# Patient Record
Sex: MALE | Race: BLACK OR AFRICAN AMERICAN | Employment: UNEMPLOYED | URBAN - METROPOLITAN AREA
[De-identification: names, ages, dates, MRNs, and addresses within clinical notes are randomized per-mention and may not be internally consistent; named-entity substitution may affect disease eponyms.]

---

## 2020-09-26 ENCOUNTER — HOSPITAL ENCOUNTER (EMERGENCY)
Age: 50
Discharge: NURSING, REHAB OR LONG-TERM CARE | End: 2020-09-30
Attending: EMERGENCY MEDICINE
Payer: MEDICAID

## 2020-09-26 DIAGNOSIS — R41.3 MEMORY LOSS: Primary | ICD-10-CM

## 2020-09-26 LAB
AMMONIA PLAS-SCNC: 15 UMOL/L (ref 11–32)
AMPHET UR QL SCN: NEGATIVE
ANION GAP SERPL CALC-SCNC: 3 MMOL/L (ref 3–18)
APPEARANCE UR: CLEAR
BARBITURATES UR QL SCN: POSITIVE
BASOPHILS # BLD: 0 K/UL (ref 0–0.1)
BASOPHILS NFR BLD: 1 % (ref 0–2)
BENZODIAZ UR QL: NEGATIVE
BILIRUB UR QL: NEGATIVE
BUN SERPL-MCNC: 11 MG/DL (ref 7–18)
BUN/CREAT SERPL: 9 (ref 12–20)
CALCIUM SERPL-MCNC: 9 MG/DL (ref 8.5–10.1)
CANNABINOIDS UR QL SCN: NEGATIVE
CHLORIDE SERPL-SCNC: 113 MMOL/L (ref 100–111)
CO2 SERPL-SCNC: 28 MMOL/L (ref 21–32)
COCAINE UR QL SCN: NEGATIVE
COLOR UR: YELLOW
CREAT SERPL-MCNC: 1.24 MG/DL (ref 0.6–1.3)
DIFFERENTIAL METHOD BLD: ABNORMAL
EOSINOPHIL # BLD: 0.1 K/UL (ref 0–0.4)
EOSINOPHIL NFR BLD: 2 % (ref 0–5)
ERYTHROCYTE [DISTWIDTH] IN BLOOD BY AUTOMATED COUNT: 12.5 % (ref 11.6–14.5)
ETHANOL SERPL-MCNC: <3 MG/DL (ref 0–3)
GLUCOSE SERPL-MCNC: 92 MG/DL (ref 74–99)
GLUCOSE UR STRIP.AUTO-MCNC: NEGATIVE MG/DL
HCT VFR BLD AUTO: 42.1 % (ref 36–48)
HDSCOM,HDSCOM: ABNORMAL
HGB BLD-MCNC: 13.8 G/DL (ref 13–16)
HGB UR QL STRIP: NEGATIVE
KETONES UR QL STRIP.AUTO: NEGATIVE MG/DL
LEUKOCYTE ESTERASE UR QL STRIP.AUTO: NEGATIVE
LYMPHOCYTES # BLD: 0.7 K/UL (ref 0.9–3.6)
LYMPHOCYTES NFR BLD: 10 % (ref 21–52)
MCH RBC QN AUTO: 30.1 PG (ref 24–34)
MCHC RBC AUTO-ENTMCNC: 32.8 G/DL (ref 31–37)
MCV RBC AUTO: 91.7 FL (ref 74–97)
METHADONE UR QL: NEGATIVE
MONOCYTES # BLD: 0.8 K/UL (ref 0.05–1.2)
MONOCYTES NFR BLD: 11 % (ref 3–10)
NEUTS SEG # BLD: 5.1 K/UL (ref 1.8–8)
NEUTS SEG NFR BLD: 76 % (ref 40–73)
NITRITE UR QL STRIP.AUTO: NEGATIVE
OPIATES UR QL: NEGATIVE
PCP UR QL: NEGATIVE
PH UR STRIP: 7.5 [PH] (ref 5–8)
PLATELET # BLD AUTO: 255 K/UL (ref 135–420)
PMV BLD AUTO: 10.6 FL (ref 9.2–11.8)
POTASSIUM SERPL-SCNC: 4.4 MMOL/L (ref 3.5–5.5)
PROT UR STRIP-MCNC: NEGATIVE MG/DL
RBC # BLD AUTO: 4.59 M/UL (ref 4.7–5.5)
SODIUM SERPL-SCNC: 144 MMOL/L (ref 136–145)
SP GR UR REFRACTOMETRY: 1.01 (ref 1–1.03)
UROBILINOGEN UR QL STRIP.AUTO: 1 EU/DL (ref 0.2–1)
WBC # BLD AUTO: 6.7 K/UL (ref 4.6–13.2)

## 2020-09-26 PROCEDURE — 81003 URINALYSIS AUTO W/O SCOPE: CPT

## 2020-09-26 PROCEDURE — 74011250637 HC RX REV CODE- 250/637: Performed by: PHYSICIAN ASSISTANT

## 2020-09-26 PROCEDURE — 80048 BASIC METABOLIC PNL TOTAL CA: CPT

## 2020-09-26 PROCEDURE — 80307 DRUG TEST PRSMV CHEM ANLYZR: CPT

## 2020-09-26 PROCEDURE — 99285 EMERGENCY DEPT VISIT HI MDM: CPT

## 2020-09-26 PROCEDURE — 96374 THER/PROPH/DIAG INJ IV PUSH: CPT

## 2020-09-26 PROCEDURE — 96375 TX/PRO/DX INJ NEW DRUG ADDON: CPT

## 2020-09-26 PROCEDURE — 82140 ASSAY OF AMMONIA: CPT

## 2020-09-26 PROCEDURE — 96376 TX/PRO/DX INJ SAME DRUG ADON: CPT

## 2020-09-26 PROCEDURE — 85025 COMPLETE CBC W/AUTO DIFF WBC: CPT

## 2020-09-26 RX ORDER — LACOSAMIDE 50 MG/1
200 TABLET ORAL 2 TIMES DAILY
Status: DISCONTINUED | OUTPATIENT
Start: 2020-09-26 | End: 2020-09-30 | Stop reason: HOSPADM

## 2020-09-26 RX ORDER — TOPIRAMATE 100 MG/1
200 TABLET, FILM COATED ORAL 2 TIMES DAILY
Status: DISCONTINUED | OUTPATIENT
Start: 2020-09-27 | End: 2020-09-30 | Stop reason: HOSPADM

## 2020-09-26 RX ORDER — PHENOBARBITAL 64.8 MG/1
64.8 TABLET ORAL 2 TIMES DAILY
Status: DISCONTINUED | OUTPATIENT
Start: 2020-09-27 | End: 2020-09-30 | Stop reason: HOSPADM

## 2020-09-26 RX ORDER — TRAZODONE HYDROCHLORIDE 50 MG/1
100 TABLET ORAL ONCE
Status: COMPLETED | OUTPATIENT
Start: 2020-09-26 | End: 2020-09-26

## 2020-09-26 RX ORDER — LEVETIRACETAM 500 MG/1
2000 TABLET ORAL 2 TIMES DAILY
Status: DISCONTINUED | OUTPATIENT
Start: 2020-09-26 | End: 2020-09-30 | Stop reason: HOSPADM

## 2020-09-26 RX ADMIN — TRAZODONE HYDROCHLORIDE 100 MG: 50 TABLET ORAL at 22:25

## 2020-09-26 NOTE — ED PROVIDER NOTES
EMERGENCY DEPARTMENT HISTORY AND PHYSICAL EXAM    Date: 9/26/2020  Patient Name: [facility]    History of Presenting Illness     Chief Complaint   Patient presents with    Memory Loss         History Provided By: Patient    Chief Complaint: memory loss  Duration: today  Timing:  Acute  Location: N/A  Quality: N/A  Severity: Moderate  Modifying Factors: none  Associated Symptoms: denies any other associated signs or symptoms      Additional History (Context): [facility] is a 53 y.o. male with seizure who presents accompanied by police with c/o memory loss. He is at very poor historian, cannot recall most of events of this morning or last night. He believes he takes medications for it but cannot recall any names. Denies drug use, notes that he used to drink alcohol but has not had any alcohol recently. He admits to memory problems in the past and believes that his family member was concerned about his memory today. He cannot recall how he got to ER. He is oriented to self and place but not to time. Per prior records (in [facility]), pt has hx of seizure and epilepsy, memory disorder, limbic encephalitis, anxiety, throat cancer and secondary HTN    PCP: UNKNOWN        Past History     Past Medical History:  No past medical history on file. Past Surgical History:  No past surgical history on file. Family History:  No family history on file. Social History:  Social History     Tobacco Use    Smoking status: Not on file   Substance Use Topics    Alcohol use: Not on file    Drug use: Not on file       Allergies:  No Known Allergies      Review of Systems   Review of Systems   Constitutional: Negative. Negative for activity change, appetite change, chills and fever. HENT: Negative. Eyes: Negative for visual disturbance. Respiratory: Negative for cough, chest tightness and shortness of breath. Cardiovascular: Negative for chest pain, palpitations and leg swelling. Gastrointestinal: Negative for abdominal pain, constipation, diarrhea, nausea and vomiting. Genitourinary: Negative. Negative for difficulty urinating. Musculoskeletal: Negative. Negative for neck pain and neck stiffness. Skin: Negative. Neurological: Negative for dizziness, syncope, weakness and headaches. Hematological: Negative for adenopathy. Psychiatric/Behavioral: Negative for agitation, confusion, dysphoric mood, hallucinations, self-injury, sleep disturbance and suicidal ideas. The patient is not nervous/anxious. Memory problem per HPI   All other systems reviewed and are negative. All Other Systems Negative  Physical Exam     Vitals:    09/26/20 1530   BP: (!) 143/88   Pulse: 87   Resp: 16   Temp: 98.2 °F (36.8 °C)   SpO2: 98%   Weight: 81.6 kg (180 lb)   Height: 5' 11\" (1.803 m)     Physical Exam  Vitals signs and nursing note reviewed. Constitutional:       General: He is not in acute distress. Appearance: He is well-developed. HENT:      Head: Normocephalic and atraumatic. Mouth/Throat:      Mouth: Mucous membranes are moist.   Eyes:      Conjunctiva/sclera: Conjunctivae normal.      Pupils: Pupils are equal, round, and reactive to light. Neck:      Musculoskeletal: Normal range of motion and neck supple. Cardiovascular:      Rate and Rhythm: Normal rate and regular rhythm. Heart sounds: Normal heart sounds. Pulmonary:      Effort: Pulmonary effort is normal. No respiratory distress. Breath sounds: Normal breath sounds. Abdominal:      General: Bowel sounds are normal.      Palpations: Abdomen is soft. Tenderness: There is no abdominal tenderness. Musculoskeletal: Normal range of motion. Lymphadenopathy:      Cervical: No cervical adenopathy. Skin:     General: Skin is warm and dry. Comments: tattoos   Neurological:      Mental Status: He is alert and oriented to person, place, and time.       Deep Tendon Reflexes: Reflexes are normal and symmetric. Psychiatric:         Attention and Perception: Attention normal.         Mood and Affect: Mood normal.         Speech: Speech normal.         Behavior: Behavior normal.         Thought Content: Thought content normal.         Cognition and Memory: Cognition normal. He exhibits impaired recent memory (Cannot recall address, does not know who the president is, does not know the date, cannot recall who brought him here, he is unsure why he is here). He does not exhibit impaired remote memory. Judgment: Judgment normal.              Diagnostic Study Results     Labs -     Recent Results (from the past 12 hour(s))   URINALYSIS W/ RFLX MICROSCOPIC    Collection Time: 09/26/20  5:10 PM   Result Value Ref Range    Color YELLOW      Appearance CLEAR      Specific gravity 1.011 1.005 - 1.030      pH (UA) 7.5 5.0 - 8.0      Protein Negative NEG mg/dL    Glucose Negative NEG mg/dL    Ketone Negative NEG mg/dL    Bilirubin Negative NEG      Blood Negative NEG      Urobilinogen 1.0 0.2 - 1.0 EU/dL    Nitrites Negative NEG      Leukocyte Esterase Negative NEG     DRUG SCREEN, URINE    Collection Time: 09/26/20  5:10 PM   Result Value Ref Range    BENZODIAZEPINES Negative NEG      BARBITURATES Positive (A) NEG      THC (TH-CANNABINOL) Negative NEG      OPIATES Negative NEG      PCP(PHENCYCLIDINE) Negative NEG      COCAINE Negative NEG      AMPHETAMINES Negative NEG      METHADONE Negative NEG      HDSCOM (NOTE)        Radiologic Studies -   No orders to display     CT Results  (Last 48 hours)    None        CXR Results  (Last 48 hours)    None            Medical Decision Making   I am the first provider for this patient. I reviewed the vital signs, available nursing notes, past medical history, past surgical history, family history and social history. Vital Signs-Reviewed the patient's vital signs.       Pulse Oximetry Analysis - 98% on RA      Records Reviewed: Nursing Notes and Old Medical Records    Procedures:  Procedures    Provider Notes (Medical Decision Making):     IMPRESSION/PLAN:  4:59 PM   Well-appearing patient with history and exam consistent with memory loss. He denies any pain or any symptoms other than memory issue. Will check labs and proceed with  when medically cleared    7:00 PM  TURN OVER NOTE:   Consulted with Luz Cardenas PA-C concerning patient [facility], standard discussion of reason for visit, HPI, ROS, PE, and current results available. Report was given at this time. Provider above will assume care at his time  PENDING: blood work, may need consult with   LAURA Aranda      MED RECONCILIATION:  No current facility-administered medications for this encounter. No current outpatient medications on file. Disposition: Care turned over to the next provider      Follow-up Information    None         There are no discharge medications for this patient. Diagnosis     Clinical Impression:   1. Memory loss             Dictation disclaimer:  Please note that this dictation was completed with [employer], the computer voice recognition software. Quite often unanticipated grammatical, syntax, homophones, and other interpretive errors are inadvertently transcribed by the computer software. Please disregard these errors. Please excuse any errors that have escaped final proofreading.

## 2020-09-26 NOTE — ED NOTES
7:00 PM: Pt care assumed from Pattie Saunders, ED provider. Pt complaint(s), current treatment plan, progression and available diagnostic results have been discussed thoroughly. Rounding occurred: no  Intended Disposition: TBD   Pending diagnostic reports and/or labs (please list): awaiting labs; will need to speak with family members. Spoke with Humphrey, patient's cousin. She states that his wife reportedly just dropped his stuff off in the middle of the street at his aunt's house. However, his aunt reportedly just had a stroke and is unable to care for him. His aunt called the police. When the police arrived, they were planning to take him back to his home where he lives with his wife, but patient reportedly did not want to go home because he does not want to fight with his wife. So they brought him here to the emergency room. According to Humphrey, patient was just released from [facility] 9 months ago where he was reportedly being cared for in the medical unit due to progressive dementia and inability to care for himself. Wife was told he needed to go to a facility after being released. Wife declined to place him in a facility and took him home instead. Patient's cousin states that they have been unable to reach patient's wife by phone since he was dropped off. My attempts to reach patient's wife gone unanswered. I have left a voicemail message. 8:01 PM: Spoke with  Samanta Medina. As patient has Medicaid, she will work on getting him placement tomorrow. 8:37 PM: Received a phone call from patient's wife. She states that she does not know why I am calling her because she is not his wife anymore. I informed her of my above conversation with patient's cousin. Patient is unwilling to comment on this. She is unwilling to give any additional information about her interactions with the patient. She wanted to know how the patient got to the emergency department.   I explained he arrived with police. She states I need to call his family for information and not her.     2:11 AM : Pt care transferred to Dr. Matt Sheikh, ED provider. History of patient complaint(s), available diagnostic reports and current treatment plan has been discussed thoroughly. Bedside rounding on patient occured : no . Intended disposition of patient : TBD  Pending diagnostics reports and/or labs (please list): pending placement by case management.

## 2020-09-26 NOTE — ED TRIAGE NOTES
\"I don't know why I'm here. I guess I have memory problems. \" Patient unable to voice concerns however patient appears pleasantly confused.

## 2020-09-26 NOTE — ED NOTES
Pt's cousin called with medically relevaent information regarding pt. Please call  Blanca Braga [phone].

## 2020-09-27 LAB
COVID-19 RAPID TEST, COVR: NOT DETECTED
HEALTH STATUS, XMCV2T: NORMAL
SOURCE, COVRS: NORMAL
SPECIMEN TYPE, XMCV1T: NORMAL

## 2020-09-27 PROCEDURE — 74011250637 HC RX REV CODE- 250/637: Performed by: PHYSICIAN ASSISTANT

## 2020-09-27 PROCEDURE — 74011250636 HC RX REV CODE- 250/636: Performed by: EMERGENCY MEDICINE

## 2020-09-27 PROCEDURE — 96372 THER/PROPH/DIAG INJ SC/IM: CPT

## 2020-09-27 PROCEDURE — 96374 THER/PROPH/DIAG INJ IV PUSH: CPT

## 2020-09-27 PROCEDURE — 96375 TX/PRO/DX INJ NEW DRUG ADDON: CPT

## 2020-09-27 PROCEDURE — 87635 SARS-COV-2 COVID-19 AMP PRB: CPT

## 2020-09-27 RX ORDER — HALOPERIDOL 5 MG/ML
5 INJECTION INTRAMUSCULAR
Status: COMPLETED | OUTPATIENT
Start: 2020-09-27 | End: 2020-09-27

## 2020-09-27 RX ORDER — METOPROLOL TARTRATE 25 MG/1
25 TABLET, FILM COATED ORAL DAILY
Status: DISCONTINUED | OUTPATIENT
Start: 2020-09-27 | End: 2020-09-30 | Stop reason: HOSPADM

## 2020-09-27 RX ORDER — DIPHENHYDRAMINE HYDROCHLORIDE 50 MG/ML
50 INJECTION, SOLUTION INTRAMUSCULAR; INTRAVENOUS
Status: COMPLETED | OUTPATIENT
Start: 2020-09-27 | End: 2020-09-27

## 2020-09-27 RX ORDER — LOSARTAN POTASSIUM 50 MG/1
100 TABLET ORAL DAILY
Status: DISCONTINUED | OUTPATIENT
Start: 2020-09-27 | End: 2020-09-30 | Stop reason: HOSPADM

## 2020-09-27 RX ORDER — LORAZEPAM 2 MG/ML
2 INJECTION INTRAMUSCULAR
Status: COMPLETED | OUTPATIENT
Start: 2020-09-27 | End: 2020-09-27

## 2020-09-27 RX ADMIN — LORAZEPAM 2 MG: 2 INJECTION INTRAMUSCULAR; INTRAVENOUS at 19:40

## 2020-09-27 RX ADMIN — PHENOBARBITAL 64.8 MG: 64.8 TABLET ORAL at 00:53

## 2020-09-27 RX ADMIN — LEVETIRACETAM 2000 MG: 500 TABLET ORAL at 00:52

## 2020-09-27 RX ADMIN — HALOPERIDOL LACTATE 5 MG: 5 INJECTION, SOLUTION INTRAMUSCULAR at 19:40

## 2020-09-27 RX ADMIN — METOPROLOL TARTRATE 25 MG: 25 TABLET, FILM COATED ORAL at 09:09

## 2020-09-27 RX ADMIN — LACOSAMIDE 200 MG: 50 TABLET, FILM COATED ORAL at 12:23

## 2020-09-27 RX ADMIN — LACOSAMIDE 200 MG: 50 TABLET, FILM COATED ORAL at 00:52

## 2020-09-27 RX ADMIN — PHENOBARBITAL 64.8 MG: 64.8 TABLET ORAL at 09:09

## 2020-09-27 RX ADMIN — TOPIRAMATE 200 MG: 100 TABLET, FILM COATED ORAL at 09:08

## 2020-09-27 RX ADMIN — LEVETIRACETAM 2000 MG: 500 TABLET ORAL at 09:09

## 2020-09-27 RX ADMIN — LACOSAMIDE 200 MG: 50 TABLET, FILM COATED ORAL at 18:38

## 2020-09-27 RX ADMIN — PHENOBARBITAL 64.8 MG: 64.8 TABLET ORAL at 19:40

## 2020-09-27 RX ADMIN — TOPIRAMATE 200 MG: 100 TABLET, FILM COATED ORAL at 18:37

## 2020-09-27 RX ADMIN — DIPHENHYDRAMINE HYDROCHLORIDE 50 MG: 50 INJECTION, SOLUTION INTRAMUSCULAR; INTRAVENOUS at 19:40

## 2020-09-27 RX ADMIN — LORAZEPAM 2 MG: 2 INJECTION, SOLUTION INTRAMUSCULAR; INTRAVENOUS at 16:44

## 2020-09-27 RX ADMIN — TOPIRAMATE 200 MG: 100 TABLET, FILM COATED ORAL at 00:52

## 2020-09-27 RX ADMIN — HALOPERIDOL LACTATE 5 MG: 5 INJECTION, SOLUTION INTRAMUSCULAR at 16:44

## 2020-09-27 RX ADMIN — LEVETIRACETAM 2000 MG: 500 TABLET ORAL at 18:38

## 2020-09-27 RX ADMIN — LOSARTAN POTASSIUM 100 MG: 50 TABLET, FILM COATED ORAL at 09:08

## 2020-09-27 NOTE — PROGRESS NOTES
LAURA Cassidy called CM last night and stated pt's wife does not want anything to do with pt and pt will need placement. She was communicating with pt's cousin Leopold. Called pt's cousin Brown Carrion [phone]. Discussed options of nursing facility long term care placement and group home placement. She stated she will prefer nursing facility placement because of pt's health issues. She is requesting placement in [place], [place], [place], [place] or [place]. Per Humphrey, pt was released from [facility] about 9 months ago for drug possession. Informed her it might hinder our search for nursing home placement. Discussed options and her questions answered. Pt has UAI/LTSS done at [facility] on 1/7/20 tracking number [id number]. Pt's clinicals sent to multiple nursing facilities in the area in [place] and [facility]. Left a message for [employer] [facility] [address]: Notified Leonidas Martinez and she stated she will review for [address], [facility], [address] and [facility] and [address] .  Notified [facility] of [facility]. She will review and call CM back. Updated pt's nurse Duc Harper. 1430: Spoke with James Hodgson.  She stated she will review and let us know if she can accept pt. Pt will need covid testing for placement  4550: Per Sue Goff, they cannot accept pt today because they have to run his Medicaid through the business office and they will do that tomorrow morning and contact Case Management.       BEVERLY AndresN RN  Care Management  Pager: [phone]

## 2020-09-27 NOTE — BSMART NOTE
Client first observed at nursing station in handcuffs with police. Agitated, screaming, uncooperative, verbally threatening staff & police. He was not arrested, received medication. 54 yo M seen in ED room 12 at the request of . Alert, oriented to name & refusing to answer other orientation questions directly. Poor historian. Could not state why he was in the ED. States he lives in [place] with his wife and was \"just hanging out in [place]. \" States he is going home to . Per EMR & report, he cannot return to that home & case management is seeking placement in a managed care facility. Hx of seizure d/o, HTN, throat cancer, dementia, memory loss. Memory is inconsistent, answers vary to repeat same questions. At times appears to be confabulating to complete answers. Judgement impaired, series of behavioral outbursts observed in ED and reported PTA. Insight nil. Pacing in room 12, sometimes just walking into ramos to look around during conversation. States that this writer is [facility] nosy\" and asked several times why I was asking questions. Each time accepted response and continued interview, then would again become guarded and suspicious. Denies suicidal and homicidal ideations, Denies A / V / T/ O hallucinations. Denies illicit drug use. Denies alcohol use or problem. States he takes medicine but does not know any names. Could not or would not state what the incident with police was about earlier. Per staff: client requested coffee & was given to him. After drinking the coffee, he stated no one had given him coffee and he reacted violently when oriented to the situation. Hx of receiving early release a few years ago from [facility] in [place] due to medical issues. (cancer, seizures, memory loss) Discussed with . Discussed with . Client does not have criteria for acute psychiatric admission.

## 2020-09-27 NOTE — ED NOTES
Assumed care of pt at this time. Pt is awake and alert but confused about why he is here and how he got here. Pt states he knows he has seizures but is unsure of what is going on. Brody SANCHEZ aware of pt mentation.   Currently awaiting ammonia result

## 2020-09-27 NOTE — ED NOTES
Pt provided hot tea and breakfast tray, has occasional confusion and verbal outbursts but is easily redirectable to sit calmly in ED room. Other than occasional brief outbursts pt is very calm and pleasant sitting in room drinking tea.

## 2020-09-27 NOTE — ED NOTES
7:29 AM :Pt care assumed from Dr. Riana Menchaca, ED provider. Pt complaint(s), current treatment plan, progression and available diagnostic results have been discussed thoroughly. Intended Disposition: TBD   Pending diagnostic reports and/or labs (please list): wife put him out of home and told PA not to call her again. Pt with early dementia. Case management to help with dispo  Tab Certain DO    Patient in no apparent distress. Is resting. 10:16 AM reassessed patient he is in no distress, no new complaint    Patient getting agitated, ambulating in the ED yelling asking for coffee, however was easily redirected by security and went back to his room. RN notified me that case management is requesting a rapid COVID for placement. 4:38 PM  Patient left the room and is yelling loudly and very threatening and escalating very quickly, unable to redirect him. He has apparently forgotten that we have given him coffee, his continues to ask for coffee saying we did not give him any. However he has gotten too aggressive. We will give emergent Haldol and Ativan to prevent him from hurting someone or himself. Police in the ED and patient confronted the police aggressively. Was placed in handcuffs. Police states that they want patient to get treatment he needs. Not going to arrest him. Initially patient was going to get placement from case management, however I believe patient needs to be evaluated by crisis. We will consult with crisis. I discussed case with Markus Salinas from crisis. Patient with no apparent psychiatric history. Has a history of memory loss. She is recommending that we continue with case management placement.     Signed out to Dr. Alex Min

## 2020-09-28 PROCEDURE — 74011250637 HC RX REV CODE- 250/637: Performed by: PHYSICIAN ASSISTANT

## 2020-09-28 RX ADMIN — LOSARTAN POTASSIUM 100 MG: 50 TABLET, FILM COATED ORAL at 09:35

## 2020-09-28 RX ADMIN — LEVETIRACETAM 2000 MG: 500 TABLET ORAL at 09:36

## 2020-09-28 RX ADMIN — LACOSAMIDE 200 MG: 50 TABLET, FILM COATED ORAL at 18:33

## 2020-09-28 RX ADMIN — PHENOBARBITAL 64.8 MG: 64.8 TABLET ORAL at 09:36

## 2020-09-28 RX ADMIN — METOPROLOL TARTRATE 25 MG: 25 TABLET, FILM COATED ORAL at 09:36

## 2020-09-28 RX ADMIN — TOPIRAMATE 200 MG: 100 TABLET, FILM COATED ORAL at 09:35

## 2020-09-28 RX ADMIN — PHENOBARBITAL 64.8 MG: 64.8 TABLET ORAL at 18:32

## 2020-09-28 RX ADMIN — LACOSAMIDE 200 MG: 50 TABLET, FILM COATED ORAL at 09:36

## 2020-09-28 RX ADMIN — TOPIRAMATE 200 MG: 100 TABLET, FILM COATED ORAL at 18:33

## 2020-09-28 RX ADMIN — LEVETIRACETAM 2000 MG: 500 TABLET ORAL at 18:32

## 2020-09-28 NOTE — PROGRESS NOTES
CM called patient's cousin, Heriberto Santiago [phone], Brittanie Barber was still speaking with Ms. Aylin Wise with [facility]. CM let Brittanie Barber know that CM will followup with her in the morning to see if patient has adult children, adult parents or siblings for next of kin.       Dany Sprague RN  Case Management [phone]

## 2020-09-28 NOTE — PROGRESS NOTES
CM sent booking request to the whole state [facility] in [place], and uploaded clinicals. CM called [facility], and left a message with the  Sravan Bee [phone]. Sravan Bee called CM back and said patient and wife divorce was final in [employer] in June 2020. Cyrus Castillo said patient's case was transferred to [facility] about 2pm today. CAIT asked Dr. Rodrigo Ramirez about a possible Crisis consult, Dr. Rodrigo Ramirez said no, Heron Torres made aware.      Caryl Davies, RN  Case Management [phone]

## 2020-09-28 NOTE — ED NOTES
Pt ambulated down ramos with unsteady gait and then urinated on floor. Techs and RNs assisted pt back to bed. Pt changed and cleaned. Vital signs stable.

## 2020-09-28 NOTE — PROGRESS NOTES
CAIT called and left a voicemail for Cynthia at [facility] to see if she has a [address] [place] [facility] bed, phone number given for return call.      Edmund Vo RN  Case Management [phone]

## 2020-09-28 NOTE — PROGRESS NOTES
Patient's cousin Ela Liu [phone] called to make sure we have it in patient's chart that patient and his wife Mariana Pagan are still legally  through [place] system. CM updated patient's cousin that CM did call [facility]. Patient's cousin said she will be calling [facility] herself also. CAIT explained that we are exploring other alternatives besides SNF and Group Homes.        Hany Zheng RN  Case Management [phone]

## 2020-09-28 NOTE — ED NOTES
Pt resting comfortably in supine position. Respirations even and unlabored. No current signs of distress.

## 2020-09-28 NOTE — ED NOTES
Shift report given to oncoming RN, Anna Bojorquez. Pt medicated after another verbal outburst and is presently calm in room.

## 2020-09-28 NOTE — ED NOTES
Pt walked into ramos and began yelling at top of lungs about wanting coffee. Pt was given a small amount of coffee and a sandwich and redirected. Pt refusing to put monitor back on. No current signs or symptoms of distress.

## 2020-09-28 NOTE — PROGRESS NOTES
CAIT spoke with Gm Ramos [phone], patient's cousin, again to explain CM would explore group homes. Morley Severe said patient and his wife are still legally , [place] [phone]. CM spoke with Waylon LONG and made her aware. CM called patient's wife [place] [phone], and received voicemail. CM left a message stating she needed to come  her , he was not being admitted, and phone number left for a return call. CM left her know if CAIT did not hear from her, CM would be contacting Adult Protective Services for Abandonment.       Mango Villarreal, RN  Case Management [phone]

## 2020-09-28 NOTE — ED NOTES
7 AM patient turned over to me Dr. Akin Goff he is a 61-year-old gentleman who has dementia with no where to go. Pt resting comfortably without complaint.       Silvio Ruiz MD    5 PM patient turned over to Dr. Danyelle Sparrow pending observation and hopefully some sort of placement tomorrow

## 2020-09-28 NOTE — ED NOTES
Pt found standing in room, confused and agitated. Pt had peed on floor. Pt redirected and floor cleaned.

## 2020-09-28 NOTE — PROGRESS NOTES
CM spoke to Ms Raz Reese with her [address] [phone], she cannot take patient due to safety concerns of her other patients. CM informed Kaci Murray and Odalys Shen. CM called and spoke to Isha Parson with her [address] [phone]. Gisselle Langston said she was Retired now, and to please take her off the [address] List.     CM informed Kaci Murray and Odalys Shen.         William Anderson, RN  Case Management [phone]

## 2020-09-28 NOTE — PROGRESS NOTES
CM called [facility] [place] [address], and spoke to [address].  gave [facility] requested information including patient's wife's name and phone number. [facility] said they would treat case as Emergent due to patient in ER, and does not need to be medically here per [facility]. Eyal Keenan and Viviana Dailey updated and made aware.        Gypsy Jorge, RN  Case Management [phone]

## 2020-09-28 NOTE — PROGRESS NOTES
Rapid Covid Test was negative, uploaded to [facility]. CM Messaged [place] [facility] [place] facilities, [facility] [place], [facility] [place], [address], and [employer] and [place]. Messaged [place] with [address], asking for history and income, CM let her know to look at ED provider not 09/26/2020 1649 for history. CM went and spoke to patient, he did not know about a check or money, alert to self and place. CM called and spoke to patient's cousin Lily Hoffman [phone]. Gladys Bahena said patient's ex-wife [facility] told her that the patient receives SSI $416 a month based on ex-wifes income. Patient ex-wife sends a copy of her paycheck to  each month, and her income at times will decrease his income. Cristian from [facility] made aware.      Blas Boyer, RN  Case Management [phone]

## 2020-09-28 NOTE — PROGRESS NOTES
CM called [facility] and spoke to Ms Chaparro Romo, she said she just got off the phone with the [address] [place] [address], and received report on this patient. CM offered patient's cousin name and number. Ms. Chaparro Romo said she would reach out to the patient's cousin now.          Nikihl Etienne RN  Case Management [phone]

## 2020-09-28 NOTE — ED NOTES
Report received from [facility]. Pt sitting in doorway quietly. Agreeable to Vital sign check, cooperative. No distress noted, asked what the date was today and writing in his notebook.

## 2020-09-28 NOTE — ED NOTES
Pt walking in hallway, has drank several cups of coffee and tea today. Pt cooperative and directs easily at present.

## 2020-09-29 PROCEDURE — 74011250637 HC RX REV CODE- 250/637: Performed by: PHYSICIAN ASSISTANT

## 2020-09-29 RX ADMIN — PHENOBARBITAL 64.8 MG: 64.8 TABLET ORAL at 18:19

## 2020-09-29 RX ADMIN — LEVETIRACETAM 2000 MG: 500 TABLET ORAL at 08:39

## 2020-09-29 RX ADMIN — TOPIRAMATE 200 MG: 100 TABLET, FILM COATED ORAL at 08:37

## 2020-09-29 RX ADMIN — LEVETIRACETAM 2000 MG: 500 TABLET ORAL at 18:19

## 2020-09-29 RX ADMIN — TOPIRAMATE 200 MG: 100 TABLET, FILM COATED ORAL at 18:19

## 2020-09-29 RX ADMIN — PHENOBARBITAL 64.8 MG: 64.8 TABLET ORAL at 08:39

## 2020-09-29 RX ADMIN — LOSARTAN POTASSIUM 100 MG: 50 TABLET, FILM COATED ORAL at 08:37

## 2020-09-29 RX ADMIN — LACOSAMIDE 200 MG: 50 TABLET, FILM COATED ORAL at 08:37

## 2020-09-29 RX ADMIN — LACOSAMIDE 200 MG: 50 TABLET, FILM COATED ORAL at 18:19

## 2020-09-29 RX ADMIN — METOPROLOL TARTRATE 25 MG: 25 TABLET, FILM COATED ORAL at 08:39

## 2020-09-29 NOTE — PROGRESS NOTES
CM called and spoke to [facility] and explained that CM was waiting on [facility] to see what they were going to to do. CM called [facility] back to let her know a SNF [facility] in [place], [place] accepted the patient, and would most likely be leaving tomorrow.        Gisela Irwin, RN  Case Management [phone]

## 2020-09-29 NOTE — PROGRESS NOTES
UAI/LTSS [place] on this date.        Naomi Britt, RN  Case Management [phone]

## 2020-09-29 NOTE — PROGRESS NOTES
Spoke with Mariana Cage at [employer] who is reviewing pt's chart for possible admission in [place]. Robb to call this CM back ASAP.        [facility]  [address] [facility] [place], [address] [place] [address]  Phone [phone]      ROGER Linder, [place]- [phone]

## 2020-09-29 NOTE — PROGRESS NOTES
CM called [employer] [phone],  and spoke to Ms Dario Yeboah, who was covering for Ms Flor Sung. CM left message to please have Ms Flor Sung call CM back with any updates on patient's case.       Gypsy Jorge RN  Case Management [phone]

## 2020-09-29 NOTE — ED NOTES
Assumed care of the patient at 7 AM from Dr. Merlinda Alto. The patient is without complaint. We are awaiting a plan from case management. Yoan Sunshine, DO 3:16 PM    Case management has a potential facility for the patient tomorrow but asked that he is no longer given antipsychotics if not needed. Will sign out to the overnight team to follow placement tomorrow. Yoan Sunshine, DO 4:35 PM

## 2020-09-29 NOTE — PROGRESS NOTES
Voicemail left for Ms. Paola Miguelina, supervisor at [address] [place] [address] requesting a return call.     Heron Torres, MSN, RN, ACM-RN  Care Management  [phone]

## 2020-09-29 NOTE — PROGRESS NOTES
CM called and spoke to Ms Jarrett Hayden at [facility] regarding APS with patient. Ms Jarrett Hayden spoke to the patient's cousin Vaughn Falcon and found out, \"patient's ex-wife dropped patient off 09/26/2020 at 12pm at patient's [address] [place] [address] in [place] with his belongings, without notice to the patient's sister home who just had a stroke. Higinio Mckinnon and her brother just happened to be at the house. Higinio Mckinnon and her brother told the patient's ex-wife she can't do this. Patient's ex-wife said she was done, and she couldn't do this anymore. The police were called due to patient's wife just dropping and leaving him at his Aunt's house. The police brought him to [facility]. \"  Ms Jarrett Hayden said the patient's divorce was in June 2020 from [facility], but she continued to have him live with her up until 09/26/2020. Ms Jarrett Hayden said the case sounds like the patient's ex-wife [facility] did an \"illegal eviction\", she needs to discuss this with her supervisors, and will keep in touch with CM.        Bindu Biggs, RN  Case Management [phone]

## 2020-09-30 VITALS
WEIGHT: 180 LBS | BODY MASS INDEX: 25.2 KG/M2 | SYSTOLIC BLOOD PRESSURE: 127 MMHG | DIASTOLIC BLOOD PRESSURE: 78 MMHG | OXYGEN SATURATION: 98 % | HEART RATE: 85 BPM | RESPIRATION RATE: 18 BRPM | TEMPERATURE: 97.5 F | HEIGHT: 71 IN

## 2020-09-30 PROCEDURE — 74011250637 HC RX REV CODE- 250/637: Performed by: PHYSICIAN ASSISTANT

## 2020-09-30 RX ORDER — TOPIRAMATE 100 MG/1
200 TABLET, FILM COATED ORAL
Status: DISCONTINUED | OUTPATIENT
Start: 2020-09-30 | End: 2020-09-30

## 2020-09-30 RX ORDER — LACOSAMIDE 50 MG/1
200 TABLET ORAL
Status: DISCONTINUED | OUTPATIENT
Start: 2020-09-30 | End: 2020-09-30

## 2020-09-30 RX ORDER — PHENOBARBITAL 30 MG/1
60 TABLET ORAL
Status: DISCONTINUED | OUTPATIENT
Start: 2020-09-30 | End: 2020-09-30

## 2020-09-30 RX ORDER — METOPROLOL TARTRATE 25 MG/1
25 TABLET, FILM COATED ORAL
Status: DISCONTINUED | OUTPATIENT
Start: 2020-09-30 | End: 2020-09-30

## 2020-09-30 RX ORDER — LOSARTAN POTASSIUM 50 MG/1
100 TABLET ORAL DAILY
Status: DISCONTINUED | OUTPATIENT
Start: 2020-10-01 | End: 2020-09-30

## 2020-09-30 RX ADMIN — LEVETIRACETAM 2000 MG: 500 TABLET ORAL at 10:36

## 2020-09-30 RX ADMIN — LOSARTAN POTASSIUM 100 MG: 50 TABLET, FILM COATED ORAL at 10:36

## 2020-09-30 RX ADMIN — TOPIRAMATE 200 MG: 100 TABLET, FILM COATED ORAL at 10:36

## 2020-09-30 RX ADMIN — PHENOBARBITAL 64.8 MG: 64.8 TABLET ORAL at 10:36

## 2020-09-30 RX ADMIN — LACOSAMIDE 200 MG: 50 TABLET, FILM COATED ORAL at 10:35

## 2020-09-30 RX ADMIN — METOPROLOL TARTRATE 25 MG: 25 TABLET, FILM COATED ORAL at 10:36

## 2020-09-30 NOTE — ED NOTES
Patient sitting in chair in the room eating lunch. 0 s/s of distress noted. 0 complaints at this time.

## 2020-09-30 NOTE — ED NOTES
Assumed care of patient, remains alert, ambulatory, and verbal. No acute distress noted. Needs met. Awaiting transport via EMTALA to [facility].

## 2020-09-30 NOTE — ED NOTES
Patient's medication per report is on Vimpat 200 mg p.o. 2 times daily  Keppra 2000 mg 2 times daily p.o. Cozaar 100 mg daily  Lopressor 25 mg daily   Phenobarbital 64.8 mg 2 times daily  Topamax 200 mg 2 times daily    Cardiac diet      Seen and evaluated no complaints General normal exam heart regular lungs are clear abdomen soft standing in the doorway with no complaints displaceable orders discussed the pharmacy it is right in place pharmacy is going to cancel those orders.   No new meds given

## 2020-09-30 NOTE — PROGRESS NOTES
[facility] [phone], set up for [employer] for anytime between 2:15-4:45pm today taking patient to [facility].  Confirmation#[id number]

## 2020-09-30 NOTE — PROGRESS NOTES
CM updated [employer] with patient's SSN, emergency contact name and phone number in [place] per request.       Cesario Roy, RN  Case Management [phone]

## 2020-09-30 NOTE — PROGRESS NOTES
CM had missed calls from patient's niece this am Jarome Resides [phone], and called her back and spoke to her. Armando Fiore was not happy with [facility] in [place], [place], low star rating. CM informed her this is the only facility in the state of [place] that has pending acceptance at this time for this patient.       Cesario Roy, RN  Case Management [phone]

## 2020-09-30 NOTE — PROGRESS NOTES
CM asked [facility] unit secretary to set up [employer] transportation to: [facility]  [address], [address] [facility]  [phone]    CM went and spoke to the patient and let him know of the transition plan today, and transport coming within 1-3 hours to pick him up. Patient is agreeable to the transition plan today. CM spoke to [facility] and made her aware of patient discharging to SNF/LTC today. [facility] spoke to Dr. Katalina Jacob, and asked for ED discharge summary to include diet and meds. CM called and spoke to [facility], patient's niece, and let her know patient discharging to above facility today between 1-3 hours, she is agreeable to the transition plan today.        Kim Coy RN  Case Management [phone]

## 2020-09-30 NOTE — PROGRESS NOTES
Patient discharging today to SNF/Long Term Care:    [facility]  [address]   [place], [address]  Phone [phone]    Patient going to Room  896H       gave Karri Gray RN phone number to call report to facility [phone].        Greyson Rios, RN  Case Management [phone]

## 2020-09-30 NOTE — PROGRESS NOTES
Transition of Care Plan to SNF/Rehab    SNF/Rehab Transition:  Patient has been accepted to [facility] and meets criteria for admission. Patient will transported by [address] transportation and expected to leave anytime between 2:15pm-4:45pm today. Communication to Patient/Family:  Met with patient and spoke to Amol Richardson patient's niece (identified care giver) and they are agreeable to the transition plan. Communication to SNF/Rehab:  Bedside RN, Edward Parra, has been notified to update the transition plan to the facility and call report (phone number [phone]). Discharge information has been updated on the AVS.     Discharge instructions to be uploaded to [facility] [facility] completed. Nursing Please include all hard scripts for controlled substances, med rec and dc summary, and AVS in packet. Reviewed and confirmed with facility, [facility], can manage the patient care needs for the following:     Ja Rosario with (X) only those applicable:    Medication:  [x]  Medications will be available at the facility  []  IV Antibiotics   []  Controlled Substance - hard copy to be sent with patient   []  Weekly Labs   Documents:  [] Hard RX  [] MAR  [] Kardex  [] AVS  []Transfer Summary  [x]Discharge Summary   Equipment:  []  CPAP/BiPAP  []  Wound Vacuum  []  Lindsay or Urinary Device  []  PICC/Central Line  []  Nebulizer  []  Ventilator   Treatment:  []Isolation (for MRSA, VRE, etc.)  []Surgical Drain Management  []Tracheostomy Care  []Dressing Changes  []Dialysis with transportation and chair time. []PEG Care  []Oxygen  []Daily Weights for Heart Failure   Dietary:  []Any diet limitations  []Tube Feedings   []Total Parenteral Management (TPN)   Eligible for Medicaid Long Term Services and Supports  Yes:  [] Eligible for medical assistance or will become eligible within 180 days and UAI completed. [] Provider/Patient and/or support system has requested screening.   [] UAI copy provided to patient or responsible party. [x] UAI unavailable at discharge will send once processed to SNF provider. [] UAI unavailable at discharged mailed to patient  No:   [] Private pay and is not financially eligible for Medicaid within the next 180 days. [] Reside out-of-state.   [] A residents of a state owned/operated facility that is licensed  by [facility] or [facility]  [] Enrollment in [facility] hospice services  [] [address] [place] [address] [address]  [] Patient /Family declines to have screening completed or provide financial information for screening     Financial Resources:  Medicaid    [] Initiated and application pending   [x] Full coverage     Advanced Care Plan:  []Surrogate Decision Maker of Care  []POA  [x]Communicated Code Status Full  (DDNR\", \"Full\")    Other

## 2020-09-30 NOTE — PROGRESS NOTES
UAI/LTSS and SNF Note uploaded to [facility]. Discharge medication list and diet order uploaded to [facility].        Kena Gomez RN  Case Management [phone]

## 2020-09-30 NOTE — PROGRESS NOTES
conducted an initial consultation and Spiritual Assessment for [facility], who is a 53 y.o.,male. Patient's Primary Language is: [place]. According to the patient's EMR Congregation Affiliation is: No preference. The reason the Patient came to the hospital is: There are no active problems to display for this patient. The  provided the following Interventions:  Initiated a relationship of care and support. Listened empathically. Provided chaplaincy education. Provided information about Spiritual Care Services. Offered prayer and assurance of continued prayers on patient's behalf. Chart reviewed. The following outcomes where achieved:  Patient expressed gratitude for 's visit. Assessment:  There are no spiritual or Jew issues which require intervention at this time. Plan:  Chaplains will continue to follow and will provide pastoral care on an as needed/requested basis.     Jace [address]   [phone]

## 2020-09-30 NOTE — ED NOTES
Patient turned over to me Dr. Melisas Bermudez 52-year-old gentleman pending placement for dementia with nowhere to go.

## 2020-09-30 NOTE — PROGRESS NOTES
Discharge order noted for today. Patient has been accepted to [facility] nursing [facility]. Confirmed with Luiza Alston that bed is available today. Met with patient and spoke to Heriberto Santiago, patient's cousin  and are agreeable to the transition plan today. Transport to facility has been arranged through [facility] anytime between 2:15pm-4:45pm  time. Patient's discharge summary will be forwarded to skilled nursing facility via [facility]. Bedside RN, Collin Miguel , has been updated to the transition plan. Discharge information has been updated on the AVS.  Please call report to [phone].       Dany Sprague RN  Case Management [phone]

## 2020-09-30 NOTE — PROGRESS NOTES
Received call from [address] [place] [address] at [facility] and are prepared to accept pt today. Requested a 4pm pickup for transport. Updated CM, Doc Jimenez.     [employer]  [address] [facility], [address] [facility]  Phone 21 [phone], [place], [place]- [phone]

## 2020-09-30 NOTE — ED NOTES
EMTALA printed. Patient is now leaving with medical transport staff. All belongings given to transport team by patient.  printed down chart and patient is now leaving premises. No acute distress noted. Needs met.

## 2020-10-02 ENCOUNTER — APPOINTMENT (OUTPATIENT)
Dept: CT IMAGING | Age: 50
DRG: 053 | End: 2020-10-02
Attending: EMERGENCY MEDICINE
Payer: MEDICAID

## 2020-10-02 ENCOUNTER — HOSPITAL ENCOUNTER (INPATIENT)
Age: 50
LOS: 6 days | Discharge: HOME HEALTH | DRG: 053 | End: 2020-10-08
Attending: EMERGENCY MEDICINE | Admitting: HOSPITALIST
Payer: MEDICAID

## 2020-10-02 ENCOUNTER — APPOINTMENT (OUTPATIENT)
Dept: GENERAL RADIOLOGY | Age: 50
DRG: 053 | End: 2020-10-02
Attending: EMERGENCY MEDICINE
Payer: MEDICAID

## 2020-10-02 DIAGNOSIS — G40.901 STATUS EPILEPTICUS (HCC): Primary | ICD-10-CM

## 2020-10-02 DIAGNOSIS — G40.209 PARTIAL SYMPTOMATIC EPILEPSY WITH COMPLEX PARTIAL SEIZURES, NOT INTRACTABLE, WITHOUT STATUS EPILEPTICUS (HCC): ICD-10-CM

## 2020-10-02 PROBLEM — R41.3 MEMORY DISORDER: Status: ACTIVE | Noted: 2019-10-04

## 2020-10-02 PROBLEM — G04.90 LIMBIC ENCEPHALITIS: Status: ACTIVE | Noted: 2019-10-04

## 2020-10-02 PROBLEM — R41.82 ALTERED MENTAL STATUS: Status: ACTIVE | Noted: 2020-10-02

## 2020-10-02 PROBLEM — F41.9 ANXIETY: Status: ACTIVE | Noted: 2019-10-04

## 2020-10-02 PROBLEM — C14.0 THROAT CANCER (HCC): Status: ACTIVE | Noted: 2019-07-22

## 2020-10-02 PROBLEM — G40.909 EPILEPSIA (HCC): Status: ACTIVE | Noted: 2019-10-14

## 2020-10-02 PROBLEM — G47.00 INSOMNIA: Status: ACTIVE | Noted: 2020-07-01

## 2020-10-02 LAB
ALBUMIN SERPL-MCNC: 4.5 G/DL (ref 3.5–5)
ALBUMIN/GLOB SERPL: 1.3 {RATIO} (ref 1.1–2.2)
ALP SERPL-CCNC: 150 U/L (ref 45–117)
ALT SERPL-CCNC: 26 U/L (ref 12–78)
AMPHET UR QL SCN: NEGATIVE
ANION GAP SERPL CALC-SCNC: 7 MMOL/L (ref 5–15)
APPEARANCE UR: CLEAR
APTT PPP: 23.5 SEC (ref 23–35.7)
AST SERPL W P-5'-P-CCNC: 28 U/L (ref 15–37)
BACTERIA URNS QL MICRO: NEGATIVE /HPF
BARBITURATES UR QL SCN: POSITIVE
BASOPHILS # BLD: 0 K/UL (ref 0–0.1)
BASOPHILS NFR BLD: 0 % (ref 0–1)
BENZODIAZ UR QL: NEGATIVE
BILIRUB SERPL-MCNC: 0.2 MG/DL (ref 0.2–1)
BILIRUB UR QL: NEGATIVE
BNP SERPL-MCNC: 16 PG/ML
BUN SERPL-MCNC: 13 MG/DL (ref 6–20)
BUN/CREAT SERPL: 10 (ref 12–20)
CA-I BLD-MCNC: 8.3 MG/DL (ref 8.5–10.1)
CANNABINOIDS UR QL SCN: NEGATIVE
CHLORIDE SERPL-SCNC: 104 MMOL/L (ref 97–108)
CO2 SERPL-SCNC: 23 MMOL/L (ref 21–32)
COCAINE UR QL SCN: NEGATIVE
COLOR UR: NORMAL
CREAT SERPL-MCNC: 1.34 MG/DL (ref 0.7–1.3)
DIFFERENTIAL METHOD BLD: ABNORMAL
DRUG SCRN COMMENT,DRGCM: ABNORMAL
EOSINOPHIL # BLD: 0.1 K/UL (ref 0–0.4)
EOSINOPHIL NFR BLD: 1 % (ref 0–7)
ERYTHROCYTE [DISTWIDTH] IN BLOOD BY AUTOMATED COUNT: 12.4 % (ref 11.5–14.5)
GLOBULIN SER CALC-MCNC: 3.6 G/DL (ref 2–4)
GLUCOSE SERPL-MCNC: 154 MG/DL (ref 65–100)
GLUCOSE UR STRIP.AUTO-MCNC: NEGATIVE MG/DL
HCT VFR BLD AUTO: 42.1 % (ref 36.6–50.3)
HGB BLD-MCNC: 13.6 G/DL (ref 12.1–17)
HGB UR QL STRIP: NEGATIVE
IMM GRANULOCYTES # BLD AUTO: 0.1 K/UL (ref 0–0.04)
IMM GRANULOCYTES NFR BLD AUTO: 1 % (ref 0–0.5)
KETONES UR QL STRIP.AUTO: NEGATIVE MG/DL
LEUKOCYTE ESTERASE UR QL STRIP.AUTO: NEGATIVE
LYMPHOCYTES # BLD: 1.4 K/UL (ref 0.8–3.5)
LYMPHOCYTES NFR BLD: 11 % (ref 12–49)
MCH RBC QN AUTO: 29.8 PG (ref 26–34)
MCHC RBC AUTO-ENTMCNC: 32.3 G/DL (ref 30–36.5)
MCV RBC AUTO: 92.3 FL (ref 80–99)
METHADONE UR QL: NEGATIVE
MONOCYTES # BLD: 1.3 K/UL (ref 0–1)
MONOCYTES NFR BLD: 10 % (ref 5–13)
MUCOUS THREADS URNS QL MICRO: NORMAL /LPF
NEUTS SEG # BLD: 9.4 K/UL (ref 1.8–8)
NEUTS SEG NFR BLD: 77 % (ref 32–75)
NITRITE UR QL STRIP.AUTO: NEGATIVE
OPIATES UR QL: NEGATIVE
PCP UR QL: NEGATIVE
PH UR STRIP: 5 [PH] (ref 5–8)
PLATELET # BLD AUTO: 330 K/UL (ref 150–400)
PMV BLD AUTO: 9.1 FL (ref 8.9–12.9)
POTASSIUM SERPL-SCNC: 4.2 MMOL/L (ref 3.5–5.1)
PROT SERPL-MCNC: 8.1 G/DL (ref 6.4–8.2)
PROT UR STRIP-MCNC: NEGATIVE MG/DL
RBC # BLD AUTO: 4.56 M/UL (ref 4.1–5.7)
RBC #/AREA URNS HPF: NORMAL /HPF (ref 0–5)
SODIUM SERPL-SCNC: 134 MMOL/L (ref 136–145)
SP GR UR REFRACTOMETRY: 1.01 (ref 1–1.03)
THERAPEUTIC RANGE,PTTT: NORMAL SEC (ref 68–109)
TROPONIN I SERPL-MCNC: <0.05 NG/ML
UA: UC IF INDICATED,UAUC: NORMAL
UROBILINOGEN UR QL STRIP.AUTO: 0.1 EU/DL (ref 0.1–1)
WBC # BLD AUTO: 12.1 K/UL (ref 4.1–11.1)
WBC URNS QL MICRO: NORMAL /HPF (ref 0–4)

## 2020-10-02 PROCEDURE — 72125 CT NECK SPINE W/O DYE: CPT

## 2020-10-02 PROCEDURE — 70450 CT HEAD/BRAIN W/O DYE: CPT

## 2020-10-02 PROCEDURE — 80307 DRUG TEST PRSMV CHEM ANLYZR: CPT

## 2020-10-02 PROCEDURE — 81001 URINALYSIS AUTO W/SCOPE: CPT

## 2020-10-02 PROCEDURE — 71045 X-RAY EXAM CHEST 1 VIEW: CPT

## 2020-10-02 PROCEDURE — 99285 EMERGENCY DEPT VISIT HI MDM: CPT

## 2020-10-02 PROCEDURE — 93005 ELECTROCARDIOGRAM TRACING: CPT

## 2020-10-02 PROCEDURE — 96375 TX/PRO/DX INJ NEW DRUG ADDON: CPT

## 2020-10-02 PROCEDURE — 74011250636 HC RX REV CODE- 250/636: Performed by: HOSPITALIST

## 2020-10-02 PROCEDURE — 74011250636 HC RX REV CODE- 250/636

## 2020-10-02 PROCEDURE — 80053 COMPREHEN METABOLIC PANEL: CPT

## 2020-10-02 PROCEDURE — 84484 ASSAY OF TROPONIN QUANT: CPT

## 2020-10-02 PROCEDURE — 83880 ASSAY OF NATRIURETIC PEPTIDE: CPT

## 2020-10-02 PROCEDURE — 36415 COLL VENOUS BLD VENIPUNCTURE: CPT

## 2020-10-02 PROCEDURE — 96365 THER/PROPH/DIAG IV INF INIT: CPT

## 2020-10-02 PROCEDURE — 85730 THROMBOPLASTIN TIME PARTIAL: CPT

## 2020-10-02 PROCEDURE — 65270000029 HC RM PRIVATE

## 2020-10-02 PROCEDURE — 74011250637 HC RX REV CODE- 250/637: Performed by: HOSPITALIST

## 2020-10-02 PROCEDURE — 85025 COMPLETE CBC W/AUTO DIFF WBC: CPT

## 2020-10-02 RX ORDER — LACOSAMIDE 200 MG/1
200 TABLET ORAL 2 TIMES DAILY
Status: DISCONTINUED | OUTPATIENT
Start: 2020-10-02 | End: 2020-10-08 | Stop reason: HOSPADM

## 2020-10-02 RX ORDER — PHENOBARBITAL 32.4 MG/1
64.8 TABLET ORAL 2 TIMES DAILY
Status: DISCONTINUED | OUTPATIENT
Start: 2020-10-02 | End: 2020-10-08 | Stop reason: HOSPADM

## 2020-10-02 RX ORDER — TRAZODONE HYDROCHLORIDE 50 MG/1
100 TABLET ORAL
Status: ON HOLD | COMMUNITY
Start: 2020-10-02 | End: 2020-10-08 | Stop reason: SDUPTHER

## 2020-10-02 RX ORDER — SODIUM CHLORIDE 0.9 % (FLUSH) 0.9 %
5-40 SYRINGE (ML) INJECTION AS NEEDED
Status: DISCONTINUED | OUTPATIENT
Start: 2020-10-02 | End: 2020-10-08 | Stop reason: HOSPADM

## 2020-10-02 RX ORDER — TOPIRAMATE 100 MG/1
200 TABLET, FILM COATED ORAL EVERY 12 HOURS
Status: DISCONTINUED | OUTPATIENT
Start: 2020-10-02 | End: 2020-10-08 | Stop reason: HOSPADM

## 2020-10-02 RX ORDER — METOPROLOL TARTRATE 25 MG/1
25 TABLET, FILM COATED ORAL 2 TIMES DAILY
Status: DISCONTINUED | OUTPATIENT
Start: 2020-10-02 | End: 2020-10-08 | Stop reason: HOSPADM

## 2020-10-02 RX ORDER — METOPROLOL TARTRATE 25 MG/1
TABLET, FILM COATED ORAL
COMMUNITY
Start: 2019-11-18 | End: 2020-10-08

## 2020-10-02 RX ORDER — CLONAZEPAM 1 MG/1
TABLET, ORALLY DISINTEGRATING ORAL
COMMUNITY
Start: 2020-01-03 | End: 2020-10-08

## 2020-10-02 RX ORDER — PROCHLORPERAZINE MALEATE 5 MG
10 TABLET ORAL
Status: DISCONTINUED | OUTPATIENT
Start: 2020-10-02 | End: 2020-10-08 | Stop reason: HOSPADM

## 2020-10-02 RX ORDER — TOPIRAMATE 100 MG/1
200 TABLET, FILM COATED ORAL EVERY 12 HOURS
COMMUNITY
Start: 2020-10-02 | End: 2020-10-08

## 2020-10-02 RX ORDER — LEVETIRACETAM 10 MG/ML
INJECTION INTRAVASCULAR
Status: COMPLETED
Start: 2020-10-02 | End: 2020-10-02

## 2020-10-02 RX ORDER — LORAZEPAM 2 MG/ML
1 INJECTION INTRAMUSCULAR
Status: DISCONTINUED | OUTPATIENT
Start: 2020-10-02 | End: 2020-10-08 | Stop reason: HOSPADM

## 2020-10-02 RX ORDER — LEVETIRACETAM 10 MG/ML
1000 INJECTION INTRAVASCULAR ONCE
Status: COMPLETED | OUTPATIENT
Start: 2020-10-02 | End: 2020-10-02

## 2020-10-02 RX ORDER — LACOSAMIDE 100 MG/1
200 TABLET ORAL 2 TIMES DAILY
Status: ON HOLD | COMMUNITY
Start: 2020-10-02 | End: 2020-10-08 | Stop reason: SDUPTHER

## 2020-10-02 RX ORDER — PHENOBARBITAL 32.4 MG/1
64.8 TABLET ORAL 2 TIMES DAILY
Status: ON HOLD | COMMUNITY
Start: 2020-09-25 | End: 2020-10-08 | Stop reason: SDUPTHER

## 2020-10-02 RX ORDER — LORAZEPAM 2 MG/ML
INJECTION INTRAMUSCULAR
Status: COMPLETED
Start: 2020-10-02 | End: 2020-10-02

## 2020-10-02 RX ORDER — LOSARTAN POTASSIUM 100 MG/1
100 TABLET ORAL DAILY
COMMUNITY
Start: 2020-09-27 | End: 2020-10-08

## 2020-10-02 RX ORDER — TRAZODONE HYDROCHLORIDE 50 MG/1
100 TABLET ORAL
Status: DISCONTINUED | OUTPATIENT
Start: 2020-10-02 | End: 2020-10-08 | Stop reason: HOSPADM

## 2020-10-02 RX ORDER — LEVETIRACETAM 500 MG/1
2000 TABLET ORAL EVERY 12 HOURS
Status: DISCONTINUED | OUTPATIENT
Start: 2020-10-02 | End: 2020-10-08 | Stop reason: HOSPADM

## 2020-10-02 RX ORDER — LEVETIRACETAM 500 MG/1
2000 TABLET ORAL EVERY 12 HOURS
COMMUNITY
Start: 2020-10-02 | End: 2020-10-08

## 2020-10-02 RX ORDER — ENOXAPARIN SODIUM 100 MG/ML
40 INJECTION SUBCUTANEOUS EVERY 24 HOURS
Status: DISCONTINUED | OUTPATIENT
Start: 2020-10-02 | End: 2020-10-08 | Stop reason: HOSPADM

## 2020-10-02 RX ORDER — LORAZEPAM 2 MG/ML
2 INJECTION INTRAMUSCULAR ONCE
Status: COMPLETED | OUTPATIENT
Start: 2020-10-02 | End: 2020-10-02

## 2020-10-02 RX ORDER — SODIUM CHLORIDE 0.9 % (FLUSH) 0.9 %
5-40 SYRINGE (ML) INJECTION EVERY 8 HOURS
Status: DISCONTINUED | OUTPATIENT
Start: 2020-10-02 | End: 2020-10-08 | Stop reason: HOSPADM

## 2020-10-02 RX ADMIN — LORAZEPAM 2 MG: 2 INJECTION INTRAMUSCULAR at 17:05

## 2020-10-02 RX ADMIN — LEVETIRACETAM 1000 MG: 10 INJECTION INTRAVENOUS at 17:50

## 2020-10-02 RX ADMIN — LEVETIRACETAM 1000 MG: 10 INJECTION INTRAVASCULAR at 17:50

## 2020-10-02 RX ADMIN — ENOXAPARIN SODIUM 40 MG: 40 INJECTION SUBCUTANEOUS at 22:26

## 2020-10-02 RX ADMIN — Medication 10 ML: at 22:00

## 2020-10-02 RX ADMIN — PHENOBARBITAL 64.8 MG: 32.4 TABLET ORAL at 22:26

## 2020-10-02 RX ADMIN — METOPROLOL TARTRATE 25 MG: 25 TABLET, FILM COATED ORAL at 22:27

## 2020-10-02 RX ADMIN — TOPIRAMATE 200 MG: 200 TABLET, FILM COATED ORAL at 22:26

## 2020-10-02 RX ADMIN — LORAZEPAM 1 MG: 2 INJECTION, SOLUTION INTRAMUSCULAR; INTRAVENOUS at 22:27

## 2020-10-02 RX ADMIN — LORAZEPAM 2 MG: 2 INJECTION, SOLUTION INTRAMUSCULAR; INTRAVENOUS at 17:05

## 2020-10-02 RX ADMIN — LEVETIRACETAM 2000 MG: 500 TABLET ORAL at 22:26

## 2020-10-02 RX ADMIN — LACOSAMIDE 200 MG: 200 TABLET, FILM COATED ORAL at 22:26

## 2020-10-02 NOTE — ED PROVIDER NOTES
Patient is a 43-year-old male with unknown past medical history who presents via EMS. He is unable to provide any history at this point time and EMS is providing it. EMS call was for unresponsiveness. In route to the emergency room and upon arrival patient had generalized tonic-clonic seizures but has not been treated with anything. Past Medical History:   Diagnosis Date    HTN (hypertension)     Seizure ([facility] [address]     Short-term memory loss        No past surgical history on file. No family history on file. Social History     Socioeconomic History    Marital status: SINGLE     Spouse name: Not on file    Number of children: Not on file    Years of education: Not on file    Highest education level: Not on file   Occupational History    Not on file   Social Needs    Financial resource strain: Not on file    Food insecurity     Worry: Not on file     Inability: Not on file    Transportation needs     Medical: Not on file     Non-medical: Not on file   Tobacco Use    Smoking status: Unknown If Ever Smoked   Substance and Sexual Activity    Alcohol use: Not Currently    Drug use: Not on file    Sexual activity: Not on file   Lifestyle    Physical activity     Days per week: Not on file     Minutes per session: Not on file    Stress: Not on file   Relationships    Social connections     Talks on phone: Not on file     Gets together: Not on file     Attends Worship service: Not on file     Active member of club or organization: Not on file     Attends meetings of clubs or organizations: Not on file     Relationship status: Not on file    Intimate partner violence     Fear of current or ex partner: Not on file     Emotionally abused: Not on file     Physically abused: Not on file     Forced sexual activity: Not on file   Other Topics Concern    Not on file   Social History Narrative    Not on file         ALLERGIES: Patient has no known allergies.     Review of Systems   Unable to perform ROS: Acuity of condition       Vitals:    10/02/20 1726   BP: (!) 164/99   Pulse: (!) 138   Resp: 16   Temp: 99.7 °F (37.6 °C)   SpO2: 94%   Weight: 83.9 kg (185 lb)   Height: 6' (1.829 m)            Physical Exam  Vitals signs and nursing note reviewed. Constitutional:       General: He is in acute distress. Appearance: He is well-developed. He is ill-appearing and diaphoretic. HENT:      Head: Normocephalic and atraumatic. Mouth/Throat:      Pharynx: No oropharyngeal exudate. Eyes:      General:         Right eye: No discharge. Left eye: No discharge. Conjunctiva/sclera: Conjunctivae normal.      Pupils: Pupils are equal, round, and reactive to light. Neck:      Musculoskeletal: Normal range of motion and neck supple. Cardiovascular:      Rate and Rhythm: Normal rate and regular rhythm. Heart sounds: No murmur. No friction rub. No gallop. Pulmonary:      Effort: Pulmonary effort is normal. No respiratory distress. Breath sounds: Normal breath sounds. No wheezing or rales. Chest:      Chest wall: No tenderness. Abdominal:      General: Bowel sounds are normal. There is no distension. Palpations: Abdomen is soft. There is no mass. Tenderness: There is no abdominal tenderness. There is no guarding or rebound. Comments: Abdominal scar   Musculoskeletal:         General: No swelling or tenderness. Comments: Unable to asses   Lymphadenopathy:      Cervical: No cervical adenopathy. Skin:     General: Skin is warm. Findings: No erythema or rash. Neurological:      Mental Status: He is unresponsive. Coordination: Coordination normal.      Comments: Generalized tonic-clonic seizure like activity with rightward gaze   Psychiatric:      Comments: Unable to assess          MDM  Number of Diagnoses or Management Options  Diagnosis management comments:  It is unknown whether or not this patient has a history of seizures but will assess with basic metabolic labs as well as CT of the head. We will continue to treat with antiepileptic medication including Ativan and Keppra at this time. We will provide serial exams for neurologic stabilization confirmation. Amount and/or Complexity of Data Reviewed  Clinical lab tests: ordered  Tests in the radiology section of CPT®: ordered and reviewed  Tests in the medicine section of CPT®: ordered  Decide to obtain previous medical records or to obtain history from someone other than the patient: yes  Independent visualization of images, tracings, or specimens: yes    Risk of Complications, Morbidity, and/or Mortality  Presenting problems: high  Diagnostic procedures: high  Management options: high      ED Course as of Oct 02 2034   Fri Oct 02, 2020   1751 Vital patient was given 2 mg of Ativan IV.    [CS]   8014 Patient's mental status is failing to improve. CT of the head is negative at this point in time. We have dosed with Ativan and Keppra will admit for further evaluation and observation.    [CS]   1930 I have been able to speak the patient's brother who conveys that that his mental status is completely changed and not normal at this point in time. Does not appear to be recovering between seizures like he has in the past.  He is unclear if he got any medication this afternoon. He does not have a list of his medications. Given the patient's continued altered sensorium from his reported baseline from family, great concern that this patient will not progress well without further evaluation.    [CS]      ED Course User Index  [CS] Pino Arceo MD       Procedures      Encounter Diagnoses     ICD-10-CM ICD-9-CM   1. [facility]  G40.901 345. 3

## 2020-10-02 NOTE — ED TRIAGE NOTES
Pt resident of [address], reportedly arrested earlier in day, found on side of road unresponsive, pupils unequal, gaze to rt, pt having sz activity, unsure of hx. Pt hypoxic with EMS.  Rcvd ativan 2mg IV on arrival.

## 2020-10-03 PROBLEM — G47.00 INSOMNIA: Status: RESOLVED | Noted: 2020-07-01 | Resolved: 2020-10-03

## 2020-10-03 PROBLEM — I10 HTN (HYPERTENSION): Status: ACTIVE | Noted: 2020-10-03

## 2020-10-03 PROBLEM — G04.90 LIMBIC ENCEPHALITIS: Status: RESOLVED | Noted: 2019-10-04 | Resolved: 2020-10-03

## 2020-10-03 LAB
ALBUMIN SERPL-MCNC: 3.5 G/DL (ref 3.5–5)
ALBUMIN/GLOB SERPL: 1.2 {RATIO} (ref 1.1–2.2)
ALP SERPL-CCNC: 117 U/L (ref 45–117)
ALT SERPL-CCNC: 21 U/L (ref 12–78)
ANION GAP SERPL CALC-SCNC: 7 MMOL/L (ref 5–15)
AST SERPL W P-5'-P-CCNC: 15 U/L (ref 15–37)
BILIRUB SERPL-MCNC: 0.6 MG/DL (ref 0.2–1)
BUN SERPL-MCNC: 13 MG/DL (ref 6–20)
BUN/CREAT SERPL: 12 (ref 12–20)
CA-I BLD-MCNC: 8.2 MG/DL (ref 8.5–10.1)
CHLORIDE SERPL-SCNC: 107 MMOL/L (ref 97–108)
CHOLEST SERPL-MCNC: 136 MG/DL
CK SERPL-CCNC: 504 U/L (ref 39–308)
CO2 SERPL-SCNC: 24 MMOL/L (ref 21–32)
CREAT SERPL-MCNC: 1.06 MG/DL (ref 0.7–1.3)
FOLATE SERPL-MCNC: 7.8 NG/ML (ref 5–21)
GLOBULIN SER CALC-MCNC: 2.9 G/DL (ref 2–4)
GLUCOSE SERPL-MCNC: 85 MG/DL (ref 65–100)
HDLC SERPL-MCNC: 74 MG/DL
HDLC SERPL: 1.8 {RATIO} (ref 0–5)
LDLC SERPL CALC-MCNC: 50.4 MG/DL (ref 0–100)
LIPID PROFILE,FLP: NORMAL
POTASSIUM SERPL-SCNC: 3.2 MMOL/L (ref 3.5–5.1)
PROT SERPL-MCNC: 6.4 G/DL (ref 6.4–8.2)
SARS-COV-2, COV2: NORMAL
SODIUM SERPL-SCNC: 138 MMOL/L (ref 136–145)
TRIGL SERPL-MCNC: 58 MG/DL (ref ?–150)
TSH SERPL DL<=0.05 MIU/L-ACNC: 1.1 UIU/ML (ref 0.36–3.74)
VIT B12 SERPL-MCNC: 278 PG/ML (ref 193–986)
VLDLC SERPL CALC-MCNC: 11.6 MG/DL

## 2020-10-03 PROCEDURE — 84443 ASSAY THYROID STIM HORMONE: CPT

## 2020-10-03 PROCEDURE — 65270000029 HC RM PRIVATE

## 2020-10-03 PROCEDURE — 74011250637 HC RX REV CODE- 250/637: Performed by: HOSPITALIST

## 2020-10-03 PROCEDURE — 74011250636 HC RX REV CODE- 250/636: Performed by: HOSPITALIST

## 2020-10-03 PROCEDURE — 87635 SARS-COV-2 COVID-19 AMP PRB: CPT

## 2020-10-03 PROCEDURE — 36415 COLL VENOUS BLD VENIPUNCTURE: CPT

## 2020-10-03 PROCEDURE — 82550 ASSAY OF CK (CPK): CPT

## 2020-10-03 PROCEDURE — 82746 ASSAY OF FOLIC ACID SERUM: CPT

## 2020-10-03 PROCEDURE — 82607 VITAMIN B-12: CPT

## 2020-10-03 PROCEDURE — 80061 LIPID PANEL: CPT

## 2020-10-03 PROCEDURE — 80053 COMPREHEN METABOLIC PANEL: CPT

## 2020-10-03 RX ADMIN — METOPROLOL TARTRATE 25 MG: 25 TABLET, FILM COATED ORAL at 09:26

## 2020-10-03 RX ADMIN — Medication 10 ML: at 14:00

## 2020-10-03 RX ADMIN — LORAZEPAM 1 MG: 2 INJECTION, SOLUTION INTRAMUSCULAR; INTRAVENOUS at 17:08

## 2020-10-03 RX ADMIN — LACOSAMIDE 200 MG: 200 TABLET, FILM COATED ORAL at 20:32

## 2020-10-03 RX ADMIN — ENOXAPARIN SODIUM 40 MG: 40 INJECTION SUBCUTANEOUS at 20:33

## 2020-10-03 RX ADMIN — LACOSAMIDE 200 MG: 200 TABLET, FILM COATED ORAL at 09:29

## 2020-10-03 RX ADMIN — PHENOBARBITAL 64.8 MG: 32.4 TABLET ORAL at 20:32

## 2020-10-03 RX ADMIN — LORAZEPAM 1 MG: 2 INJECTION, SOLUTION INTRAMUSCULAR; INTRAVENOUS at 20:08

## 2020-10-03 RX ADMIN — TOPIRAMATE 200 MG: 200 TABLET, FILM COATED ORAL at 09:26

## 2020-10-03 RX ADMIN — LORAZEPAM 1 MG: 2 INJECTION, SOLUTION INTRAMUSCULAR; INTRAVENOUS at 11:32

## 2020-10-03 RX ADMIN — LEVETIRACETAM 2000 MG: 500 TABLET ORAL at 20:33

## 2020-10-03 RX ADMIN — LEVETIRACETAM 2000 MG: 500 TABLET ORAL at 09:25

## 2020-10-03 RX ADMIN — Medication 10 ML: at 22:00

## 2020-10-03 RX ADMIN — Medication 10 ML: at 06:46

## 2020-10-03 RX ADMIN — Medication 10 ML: at 20:10

## 2020-10-03 RX ADMIN — METOPROLOL TARTRATE 25 MG: 25 TABLET, FILM COATED ORAL at 20:33

## 2020-10-03 RX ADMIN — PHENOBARBITAL 64.8 MG: 32.4 TABLET ORAL at 09:25

## 2020-10-03 RX ADMIN — TOPIRAMATE 200 MG: 200 TABLET, FILM COATED ORAL at 20:33

## 2020-10-03 NOTE — ED NOTES
8:35 AM      This nurse spoke with neurology for consult. Neurologist states that he is on his way to come see patient.

## 2020-10-03 NOTE — CONSULTS
Neurology Consult Note    HPI  Mr. Soledad Guajardo is a 53 y.o.  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 Patient reportedly was admitted to a nursing home but had an altercation there for which she was arrested. Patient was released on 10/2/2020 but had not received his seizure medications. Before patient's family could come to pick him up he had left and was subsequently found on the street with his symptoms. Per chart review, patient had a generalized convulsive episode when brought into the ED and was treated with IV lorazepam.  Emergency CT head revealed no acute findings. Patient is confused on examination but totally alert he is oriented to self and knows that he is in a hospital but otherwise does not know the situation, the year, who the president is and is on progress medical history. He perseverates a good amount. He reports he is feeling fine and that he wants to go home to [facility]. Spoke with patient's nurse who reported that patient has not been able to take care of himself for the last several years ever since he was diagnosed with his brain tumor and had subsequent surgery. Patient has had intractable seizures in the past and was placed on multiple AEDs as a result. Apparently, if patient misses any of his medications he tends to have seizure activity. Patient was recently admitted to a nursing home but had an altercation there after which she was taken by the authorities. Patient was subsequently discharged and was supposed to wait for his brother but left the facility.     Home AEDs  Levetiracetam 2000 BID  Lacosamide 200 BID  Topiramate 200 BID  Phenobarbital 64.8 BID       CTH WO  NAICA       Lab Studies  -Positive:   WBC 12.1, UDS (barbiturates),   -Negative/WNL:  Na, BUN, creatinine 1.34, troponin, BNP, UA, TSH      Past Medical History:   Diagnosis Date    HTN (hypertension)     Left inguinal hernia     Limbic encephalitis     [facility] ([facility] [address]     Short-term memory loss       Past Surgical History:   Procedure Laterality Date    HX CRANIOTOMY      had brain cancer, does not know the details     No Known Allergies  Prior to Admission medications    Medication Sig Start Date End Date Taking? Authorizing Provider   levETIRAcetam (KEPPRA) 500 mg tablet Take 2,000 mg by mouth every twelve (12) hours. 10/2/20   Other, MD Hue   lacosamide (VIMPAT) 100 mg tab tablet Take 200 mg by mouth two (2) times a day. 10/2/20   uHe Bowens MD   clonazePAM (KlonoPIN) 1 mg TbDi disintegrating tablet 1 wafer under the tongue PRN seizure. Max use 2 wafers per day 1/3/20   Hue Bowens MD   losartan (COZAAR) 100 mg tablet Take 100 mg by mouth daily. 20   Hue Bowens MD   topiramate (TOPAMAX) 100 mg tablet Take 200 mg by mouth every twelve (12) hours. 10/2/20   Hue Bowens MD   traZODone (DESYREL) 50 mg tablet Take 100 mg by mouth nightly. 10/2/20   Hue Bowens MD   metoprolol tartrate (LOPRESSOR) 25 mg tablet TAKE 1 TABLET BY MOUTH TWICE DAILY 19   Hue Bowens MD   PHENobarbitaL (LUMINAL) 32.4 mg tablet Take 64.8 mg by mouth two (2) times a day.  20   Hue Bowens MD     Family History   Problem Relation Age of Onset    No Known Problems Mother         , not sure about medical issues     Relationships   Social connections    Talks on phone: Not on file    Gets together: Not on file    Attends [employer] service: Not on file    Active member of club or organization: Not on file    Attends meetings of clubs or organizations: Not on file    Relationship status: Not on file         Medications  Current Outpatient Medications   Medication Instructions    clonazePAM (KlonoPIN) 1 mg TbDi disintegrating tablet 1 wafer under the tongue PRN seizure. Max use 2 wafers per day    lacosamide (VIMPAT) 200 mg, Oral, 2 TIMES DAILY    levETIRAcetam (KEPPRA) 2,000 mg, Oral, EVERY 12 HOURS    losartan (COZAAR) 100 mg, Oral, DAILY    metoprolol tartrate (LOPRESSOR) 25 mg tablet TAKE 1 TABLET BY MOUTH TWICE DAILY    PHENobarbitaL (LUMINAL) 64.8 mg, Oral, 2 TIMES DAILY    topiramate (TOPAMAX) 200 mg, Oral, EVERY 12 HOURS    traZODone (DESYREL) 100 mg, Oral, EVERY BEDTIME       Current Facility-Administered Medications   Medication Dose Route Frequency    lacosamide (VIMPAT) tablet 200 mg  200 mg Oral BID    levETIRAcetam (KEPPRA) tablet 2,000 mg  2,000 mg Oral Q12H    metoprolol tartrate (LOPRESSOR) tablet 25 mg  25 mg Oral BID    PHENobarbitaL (LUMINAL) tablet 64.8 mg  64.8 mg Oral BID    topiramate (TOPAMAX) tablet 200 mg  200 mg Oral Q12H    traZODone (DESYREL) tablet 100 mg  100 mg Oral QHS    sodium chloride (NS) flush 5-40 mL  5-40 mL IntraVENous Q8H    sodium chloride (NS) flush 5-40 mL  5-40 mL IntraVENous PRN    LORazepam (ATIVAN) injection 1 mg  1 mg IntraVENous Q4H PRN    prochlorperazine (COMPAZINE) tablet 10 mg  10 mg Oral TID PRN    enoxaparin (LOVENOX) injection 40 mg  40 mg SubCUTAneous Q24H     Current Outpatient Medications   Medication Sig    levETIRAcetam (KEPPRA) 500 mg tablet Take 2,000 mg by mouth every twelve (12) hours.  lacosamide (VIMPAT) 100 mg tab tablet Take 200 mg by mouth two (2) times a day.  clonazePAM (KlonoPIN) 1 mg TbDi disintegrating tablet 1 wafer under the tongue PRN seizure. Max use 2 wafers per day    losartan (COZAAR) 100 mg tablet Take 100 mg by mouth daily.  topiramate (TOPAMAX) 100 mg tablet Take 200 mg by mouth every twelve (12) hours.  traZODone (DESYREL) 50 mg tablet Take 100 mg by mouth nightly.  metoprolol tartrate (LOPRESSOR) 25 mg tablet TAKE 1 TABLET BY MOUTH TWICE DAILY    PHENobarbitaL (LUMINAL) 32.4 mg tablet Take 64.8 mg by mouth two (2) times a day. Review of Systems  A 14 point review was done and pertinent positives & negative findings are listed as part of the history of present illness, all other systems were reviewed and are negative. Objective  Temp:  [99.7 °F (37.6 °C)]   Pulse (Heart Rate):  []   BP: (126-164)/(88-99)   Resp Rate:  [16-18]   O2 Sat (%):  [94 %-99 %]   Weight:  [83.9 kg (185 lb)]     Intake/Output Summary (Last 24 hours) at 10/2/2020 2153  Last data filed at 10/2/2020 1805  Gross per 24 hour   Intake 50 ml   Output    Net 50 ml     Wt Readings from Last 3 Encounters:   10/02/20 83.9 kg (185 lb)   09/30/20 81.6 kg (180 lb)        Physical/Neurological Exam  General: Middle-aged -American male, compliant, pleasant  Cardiovascular: normal rate and rhythm   Pulmonary: CTAB   Gastrointestinal/Abdomen: soft w/hypoactive bowel sounds   Skin: warm and dry      Patient awake, alert but not oriented; following central and peripheral commands   Intact attention and concentration   No expressive or receptive aphasia;  No dysarthria   Pupils react to light bilaterally; EOM Intact   No visual field deficits on gross exam   Unable to visualize optic disc had a retinal vessels on funduscopic examination    Intact to light touch on face bilaterally   Question left lower facial droop with flattening of the nasolabial fold  No gross hearing loss   Tongue is midline   Motor: 5/5 Throughout  FTN intact bilaterally   No abnormal movements   Normal tone throughout   Sensation to light touch intact grossly throughout    Reflexes: 1+ at bilateral ankles, 1+ in bilateral knees, 2+ in bilateral brachioradialis  Toes equivocal bilaterally, no ankle clonus with forced dorsiflexion bilaterally, negative Pennington's bilaterally   Gait deferred     Labs  Recent Results (from the past 24 hour(s))   CBC WITH AUTOMATED DIFF    Collection Time: 10/02/20  5:15 PM   Result Value Ref Range    WBC 12.1 (H) 4.1 - 11.1 K/uL    RBC 4.56 4.10 - 5.70 M/uL HGB 13.6 12.1 - 17.0 g/dL    HCT 42.1 36.6 - 50.3 %    MCV 92.3 80.0 - 99.0 FL    MCH 29.8 26.0 - 34.0 PG    MCHC 32.3 30.0 - 36.5 g/dL    RDW 12.4 11.5 - 14.5 %    PLATELET 112 666 - 452 K/uL    MPV 9.1 8.9 - 12.9 FL    NEUTROPHILS 77 (H) 32 - 75 %    LYMPHOCYTES 11 (L) 12 - 49 %    MONOCYTES 10 5 - 13 %    EOSINOPHILS 1 0 - 7 %    BASOPHILS 0 0 - 1 %    IMMATURE GRANULOCYTES 1 (H) 0.0 - 0.5 %    ABS. NEUTROPHILS 9.4 (H) 1.8 - 8.0 K/UL    ABS. LYMPHOCYTES 1.4 0.8 - 3.5 K/UL    ABS. MONOCYTES 1.3 (H) 0.0 - 1.0 K/UL    ABS. EOSINOPHILS 0.1 0.0 - 0.4 K/UL    ABS. BASOPHILS 0.0 0.0 - 0.1 K/UL    ABS. IMM. GRANS. 0.1 (H) 0.00 - 0.04 K/UL    DF AUTOMATED     METABOLIC PANEL, COMPREHENSIVE    Collection Time: 10/02/20  5:15 PM   Result Value Ref Range    Sodium 134 (L) 136 - 145 mmol/L    Potassium 4.2 3.5 - 5.1 mmol/L    Chloride 104 97 - 108 mmol/L    CO2 23 21 - 32 mmol/L    Anion gap 7 5 - 15 mmol/L    Glucose 154 (H) 65 - 100 mg/dL    BUN 13 6 - 20 mg/dL    Creatinine 1.34 (H) 0.70 - 1.30 mg/dL    BUN/Creatinine ratio 10 (L) 12 - 20      GFR est AA >60 >60 ml/min/1.73m2    GFR est non-AA 57 (L) >60 ml/min/1.73m2    Calcium 8.3 (L) 8.5 - 10.1 mg/dL    Bilirubin, total 0.2 0.2 - 1.0 mg/dL    AST (SGOT) 28 15 - 37 U/L    ALT (SGPT) 26 12 - 78 U/L    Alk.  phosphatase 150 (H) 45 - 117 U/L    Protein, total 8.1 6.4 - 8.2 g/dL    Albumin 4.5 3.5 - 5.0 g/dL    Globulin 3.6 2.0 - 4.0 g/dL    A-G Ratio 1.3 1.1 - 2.2     TROPONIN I    Collection Time: 10/02/20  5:15 PM   Result Value Ref Range    Troponin-I, Qt. <0.05 <0.05 ng/mL   BNP    Collection Time: 10/02/20  5:15 PM   Result Value Ref Range    NT pro-BNP 16 <125 pg/mL   PTT    Collection Time: 10/02/20  5:15 PM   Result Value Ref Range    aPTT 23.5 23.0 - 35.7 sec    aPTT, therapeutic range   68 - 109 sec   URINALYSIS W/ REFLEX CULTURE    Collection Time: 10/02/20  5:45 PM    Specimen: Urine   Result Value Ref Range    Color Yellow/Straw      Appearance Clear Clear Specific gravity 1.011 1.003 - 1.030      pH (UA) 5.0 5.0 - 8.0      Protein Negative Negative mg/dL    Glucose Negative Negative mg/dL    Ketone Negative Negative mg/dL    Bilirubin Negative Negative      Blood Negative Negative      Urobilinogen 0.1 0.1 - 1.0 EU/dL    Nitrites Negative Negative      Leukocyte Esterase Negative Negative      UA:UC IF INDICATED Culture not indicated by UA result Culture not indicated by UA result      WBC 0-4 0 - 4 /hpf    RBC 0-5 0 - 5 /hpf    Bacteria Negative Negative /hpf    Mucus Trace /lpf   DRUG SCREEN, URINE    Collection Time: 10/02/20  5:45 PM   Result Value Ref Range    AMPHETAMINES Negative Negative      BARBITURATES Positive (A) Negative      BENZODIAZEPINES Negative Negative      COCAINE Negative Negative      METHADONE Negative Negative      OPIATES Negative Negative      PCP(PHENCYCLIDINE) Negative Negative      THC (TH-CANNABINOL) Negative Negative      Drug screen comment        This test is a screen for drugs of abuse in a medical setting only (i.e., they are unconfirmed results and as such must not be used for non-medical purposes, e.g.,employment testing, legal testing). Due to its inherent nature, false positive (FP) and false negative (FN) results may be obtained. Therefore, if necessary for medical care, recommend confirmation of positive findings by GC/MS. Significant Diagnostic Studies  All images independently visualized    XR CHEST SNGL V   Final Result   IMPRESSION: This examination is negative for acute pulmonary parenchymal   pathology. CT HEAD WO CONT   Final Result   IMPRESSION:    No acute intracranial abnormality. CT SPINE CERV WO CONT   Final Result   IMPRESSION:   1. No acute fracture the cervical spine. 2. Mild degenerative changes.             IMPRESSION  Mr. Nelsy Acosta is a 53 y.o.  male with the above history who presented after being found on the road unresponsive with gaze deviation and generalized convulsive activity. When patient was brought to the ED he had another generalized convulsive episode for which he was treated with IV lorazepam.  Emergent CT head was negative in the ED. Patient was loaded with IV levetiracetam 1000 while in the ED. On neuro exam patient without significant focal or lateralizing findings except for possibly flattening of the nasolabial fold on the left. Patient is alert and awake but not oriented. He has difficulty with short-term memory as he asks similar questions throughout the interview. Etiology of patient's breakthrough seizure related to missing his AED regimen while being held by the authorities. Will restart patient's home AEDs and monitor for improvement. Will consider changes to patient's AED based on seizure activity while patient is admitted and getting his AEDs. Negative CT head is reassuring for lack of acute CNS process. Patient will need to be followed up in clinic by his personal neurologist.      RECOMMENDATIONS      Breakthrough Seizures  -Q2Hr NeuroChecks  -Seizure Precautions  -Continue   Levetiraceatam 2000 BID   Lacosamide 200 BID   Topiramate 200 BID   Phenobarbital 64.8 BID  -STAT IV Lorazepam 2 mg with any clinical seizure activity lasting > 3 minutes and contact Neurology for further recommendations    Patient to follow-up with personal neurologist, likely epileptic allergist given multitude of AEDs, in 1 to 2 weeks after discharge. Diagnosis and plan was discussed extensively with patient who is in agreement with the above plan. They have been counseled regarding potential side effects of the interventions above and would like to proceed with the aforementioned plan. This document has been prepared by the Dragon voice recognition system, typographical errors may have occurred.  Attempts have been made to correct errors, however inadvertent errors may persist.

## 2020-10-03 NOTE — PROGRESS NOTES
Hospitalist Progress Note               Daily Progress Note: 10/3/2020      Subjective: The patient is seen for follow  up.   21-year-old male with a history of seizure disorder was admitted to the hospital with a complaint of altered mental status. Patient had a generalized tonic-clonic seizure in ER. He has been started on his antiseizure medications. Patient is pleasantly confused, unable to tell me why he is in the hospital.    Medications reviewed  Current Facility-Administered Medications   Medication Dose Route Frequency    lacosamide (VIMPAT) tablet 200 mg  200 mg Oral BID    levETIRAcetam (KEPPRA) tablet 2,000 mg  2,000 mg Oral Q12H    metoprolol tartrate (LOPRESSOR) tablet 25 mg  25 mg Oral BID    PHENobarbitaL (LUMINAL) tablet 64.8 mg  64.8 mg Oral BID    topiramate (TOPAMAX) tablet 200 mg  200 mg Oral Q12H    traZODone (DESYREL) tablet 100 mg  100 mg Oral QHS    sodium chloride (NS) flush 5-40 mL  5-40 mL IntraVENous Q8H    sodium chloride (NS) flush 5-40 mL  5-40 mL IntraVENous PRN    LORazepam (ATIVAN) injection 1 mg  1 mg IntraVENous Q4H PRN    prochlorperazine (COMPAZINE) tablet 10 mg  10 mg Oral TID PRN    enoxaparin (LOVENOX) injection 40 mg  40 mg SubCUTAneous Q24H     Current Outpatient Medications   Medication Sig    levETIRAcetam (KEPPRA) 500 mg tablet Take 2,000 mg by mouth every twelve (12) hours.  lacosamide (VIMPAT) 100 mg tab tablet Take 200 mg by mouth two (2) times a day.  clonazePAM (KlonoPIN) 1 mg TbDi disintegrating tablet 1 wafer under the tongue PRN seizure. Max use 2 wafers per day    losartan (COZAAR) 100 mg tablet Take 100 mg by mouth daily.  topiramate (TOPAMAX) 100 mg tablet Take 200 mg by mouth every twelve (12) hours.  traZODone (DESYREL) 50 mg tablet Take 100 mg by mouth nightly.     metoprolol tartrate (LOPRESSOR) 25 mg tablet TAKE 1 TABLET BY MOUTH TWICE DAILY    PHENobarbitaL (LUMINAL) 32.4 mg tablet Take 64.8 mg by mouth two (2) times a day.       Review of Systems:   Review of systems not obtained due to patient factors. Objective:   Physical Exam:     Visit Vitals  BP (!) 144/98 (BP Patient Position: At rest)   Pulse 82   Temp 98.4 °F (36.9 °C)   Resp 12   Ht 6' (1.829 m)   Wt 83.9 kg (185 lb)   SpO2 99%   BMI 25.09 kg/m²    O2 Flow Rate (L/min): 65 l/min O2 Device: Room air    Temp (24hrs), Av.1 °F (37.3 °C), Min:98.4 °F (36.9 °C), Max:99.7 °F (37.6 °C)    No intake/output data recorded. 10/01 1901 - 10/03 0700  In: 48 [I.V.:50]  Out: -     PHYSICAL EXAM:  General: Awake and alert  Skin: Extremities and face reveal no rashes. HEENT: Sclerae anicteric. Extra-occular muscles are intact. No oral ulcers. No ENT discharge. The neck is supple. Cardiovascular: Regular rate and rhythm. No murmurs, gallops, or rubs. PMI nondisplaced. Carotids without bruits. Respiratory: Comfortable breathing with no accessory muscle use. Clear breath sounds with no wheezes, rales, or rhonchi. GI: Abdomen nondistended, soft, and nontender. Normal active bowel sounds. No enlargement of the liver or spleen. No masses palpable. Rectal: Deferred   Musculoskeletal: No pitting edema of the lower legs. Extremities have good range of motion. No costovertebral tenderness. Neurological: Pleasantly confused. Patient is alert. Moving all 4 extremities without any difficulties, cranial nerves II through XII grossly intact. Psychiatric: Calm and cooperative      Data Review:       Recent Days:  Recent Labs     10/02/20  1715   WBC 12.1*   HGB 13.6   HCT 42.1        Recent Labs     10/03/20  0158 10/02/20  1715    134*   K 3.2* 4.2    104   CO2 24 23   GLU 85 154*   BUN 13 13   CREA 1.06 1.34*   CA 8.2* 8.3*   ALB 3.5 4.5   TBILI 0.6 0.2   ALT 21 26     No results for input(s): PH, PCO2, PO2, HCO3, FIO2 in the last 72 hours.     24 Hour Results:  Recent Results (from the past 24 hour(s))   CBC WITH AUTOMATED DIFF    Collection Time: 10/02/20 5:15 PM   Result Value Ref Range    WBC 12.1 (H) 4.1 - 11.1 K/uL    RBC 4.56 4.10 - 5.70 M/uL    HGB 13.6 12.1 - 17.0 g/dL    HCT 42.1 36.6 - 50.3 %    MCV 92.3 80.0 - 99.0 FL    MCH 29.8 26.0 - 34.0 PG    MCHC 32.3 30.0 - 36.5 g/dL    RDW 12.4 11.5 - 14.5 %    PLATELET 002 162 - 553 K/uL    MPV 9.1 8.9 - 12.9 FL    NEUTROPHILS 77 (H) 32 - 75 %    LYMPHOCYTES 11 (L) 12 - 49 %    MONOCYTES 10 5 - 13 %    EOSINOPHILS 1 0 - 7 %    BASOPHILS 0 0 - 1 %    IMMATURE GRANULOCYTES 1 (H) 0.0 - 0.5 %    ABS. NEUTROPHILS 9.4 (H) 1.8 - 8.0 K/UL    ABS. LYMPHOCYTES 1.4 0.8 - 3.5 K/UL    ABS. MONOCYTES 1.3 (H) 0.0 - 1.0 K/UL    ABS. EOSINOPHILS 0.1 0.0 - 0.4 K/UL    ABS. BASOPHILS 0.0 0.0 - 0.1 K/UL    ABS. IMM. GRANS. 0.1 (H) 0.00 - 0.04 K/UL    DF AUTOMATED     METABOLIC PANEL, COMPREHENSIVE    Collection Time: 10/02/20  5:15 PM   Result Value Ref Range    Sodium 134 (L) 136 - 145 mmol/L    Potassium 4.2 3.5 - 5.1 mmol/L    Chloride 104 97 - 108 mmol/L    CO2 23 21 - 32 mmol/L    Anion gap 7 5 - 15 mmol/L    Glucose 154 (H) 65 - 100 mg/dL    BUN 13 6 - 20 mg/dL    Creatinine 1.34 (H) 0.70 - 1.30 mg/dL    BUN/Creatinine ratio 10 (L) 12 - 20      GFR est AA >60 >60 ml/min/1.73m2    GFR est non-AA 57 (L) >60 ml/min/1.73m2    Calcium 8.3 (L) 8.5 - 10.1 mg/dL    Bilirubin, total 0.2 0.2 - 1.0 mg/dL    AST (SGOT) 28 15 - 37 U/L    ALT (SGPT) 26 12 - 78 U/L    Alk.  phosphatase 150 (H) 45 - 117 U/L    Protein, total 8.1 6.4 - 8.2 g/dL    Albumin 4.5 3.5 - 5.0 g/dL    Globulin 3.6 2.0 - 4.0 g/dL    A-G Ratio 1.3 1.1 - 2.2     TROPONIN I    Collection Time: 10/02/20  5:15 PM   Result Value Ref Range    Troponin-I, Qt. <0.05 <0.05 ng/mL   BNP    Collection Time: 10/02/20  5:15 PM   Result Value Ref Range    NT pro-BNP 16 <125 pg/mL   PTT    Collection Time: 10/02/20  5:15 PM   Result Value Ref Range    aPTT 23.5 23.0 - 35.7 sec    aPTT, therapeutic range   68 - 109 sec   URINALYSIS W/ REFLEX CULTURE    Collection Time: 10/02/20  5:45 PM Specimen: Urine   Result Value Ref Range    Color Yellow/Straw      Appearance Clear Clear      Specific gravity 1.011 1.003 - 1.030      pH (UA) 5.0 5.0 - 8.0      Protein Negative Negative mg/dL    Glucose Negative Negative mg/dL    Ketone Negative Negative mg/dL    Bilirubin Negative Negative      Blood Negative Negative      Urobilinogen 0.1 0.1 - 1.0 EU/dL    Nitrites Negative Negative      Leukocyte Esterase Negative Negative      UA:UC IF INDICATED Culture not indicated by UA result Culture not indicated by UA result      WBC 0-4 0 - 4 /hpf    RBC 0-5 0 - 5 /hpf    Bacteria Negative Negative /hpf    Mucus Trace /lpf   DRUG SCREEN, URINE    Collection Time: 10/02/20  5:45 PM   Result Value Ref Range    AMPHETAMINES Negative Negative      BARBITURATES Positive (A) Negative      BENZODIAZEPINES Negative Negative      COCAINE Negative Negative      METHADONE Negative Negative      OPIATES Negative Negative      PCP(PHENCYCLIDINE) Negative Negative      THC (TH-CANNABINOL) Negative Negative      Drug screen comment        This test is a screen for drugs of abuse in a medical setting only (i.e., they are unconfirmed results and as such must not be used for non-medical purposes, e.g.,employment testing, legal testing). Due to its inherent nature, false positive (FP) and false negative (FN) results may be obtained. Therefore, if necessary for medical care, recommend confirmation of positive findings by GC/MS.    METABOLIC PANEL, COMPREHENSIVE    Collection Time: 10/03/20  1:58 AM   Result Value Ref Range    Sodium 138 136 - 145 mmol/L    Potassium 3.2 (L) 3.5 - 5.1 mmol/L    Chloride 107 97 - 108 mmol/L    CO2 24 21 - 32 mmol/L    Anion gap 7 5 - 15 mmol/L    Glucose 85 65 - 100 mg/dL    BUN 13 6 - 20 mg/dL    Creatinine 1.06 0.70 - 1.30 mg/dL    BUN/Creatinine ratio 12 12 - 20      GFR est AA >60 >60 ml/min/1.73m2    GFR est non-AA >60 >60 ml/min/1.73m2    Calcium 8.2 (L) 8.5 - 10.1 mg/dL    Bilirubin, total 0.6 0.2 - 1.0 mg/dL    AST (SGOT) 15 15 - 37 U/L    ALT (SGPT) 21 12 - 78 U/L    Alk. phosphatase 117 45 - 117 U/L    Protein, total 6.4 6.4 - 8.2 g/dL    Albumin 3.5 3.5 - 5.0 g/dL    Globulin 2.9 2.0 - 4.0 g/dL    A-G Ratio 1.2 1.1 - 2.2     TSH 3RD GENERATION    Collection Time: 10/03/20  1:58 AM   Result Value Ref Range    TSH 1.10 0.36 - 3.74 uIU/mL   CK    Collection Time: 10/03/20  1:58 AM   Result Value Ref Range     (H) 39 - 308 U/L       XR CHEST SNGL V   Final Result   IMPRESSION: This examination is negative for acute pulmonary parenchymal   pathology. CT HEAD WO CONT   Final Result   IMPRESSION:    No acute intracranial abnormality. CT SPINE CERV WO CONT   Final Result   IMPRESSION:   1. No acute fracture the cervical spine. 2. Mild degenerative changes.              Assessment/     Problem List:  Problem List as of 10/3/2020 Date Reviewed: 10/2/2020          Codes Class Noted - Resolved    HTN (hypertension) ICD-10-CM: I10  ICD-9-CM: 401.9  10/3/2020 - Present        * (Principal) Altered mental status ICD-10-CM: R41.82  ICD-9-CM: 780.97  10/2/2020 - Present        Epilepsia ([facility] ICD-10-CM: G40.909  ICD-9-CM: 345.90  10/14/2019 - Present    Overview Signed 10/2/2020  7:55 PM by Kirby Boudreaux MD     7/29 consult neurosurgery for neuromodulation-DBS surgical placement             Anxiety ICD-10-CM: F41.9  ICD-9-CM: 300.00  10/4/2019 - Present        Memory disorder ICD-10-CM: R41.3  ICD-9-CM: 780.93  10/4/2019 - Present        Throat cancer ([facility] ICD-10-CM: C14.0  ICD-9-CM: 149.0  7/22/2019 - Present    Overview Signed 10/2/2020  7:55 PM by Kirby Boudreaux MD     7/2019 Followed by Dr Carmine Daily, [facility], has appt 8/15/19             RESOLVED: Insomnia ICD-10-CM: G47.00  ICD-9-CM: 780.52  7/1/2020 - 10/3/2020        RESOLVED: 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 Tonic-clonic seizure with status epilepticus: Patient has been resumed on his home medications for seizure. Neurology evaluation of the patient has also been requested. 2.  Short-term memory loss: There is a questionable history of brain surgery and malignancy, CT has been does not comment on any surgical changes. However there are some surgical clips in the left neck area. Unfortunately patient is not able to give meMuch history about his medical issues  3. Hypertension: Resume patient's home medications and monitor blood pressure closely  4. Hypokalemia will supplement as needed.   Further plan of management will depend upon patient's clinical course in the hospital  Discussed with case management regarding arrangement for discharge    Care Plan discussed with: Nurse and         Opal Kitchen MD

## 2020-10-03 NOTE — H&P
History and Physical              Subjective :   Chief Complaint : Altered mental status    Source of information : From the brother at bedside, ED provider. History of present illness:   53 y.o. male with history of seizures secondary to some brain malignancy presents to the emergency room with altered mental status. As per the brother this patient was completely fine when he was incarcerated in the past, 2 1/2 years before he was released he developed some kind of brain tumor had surgery done, he does not know about the diagnosis. But since then his mental status is completely changed, unable to take care of him at home. He was released 9 months ago, wife unable to take care of him and does not want to take care of anything for him. She is legally  still. He was recently evaluated in the emergency room, after multiple evaluations was placed in [address] [place] [address]. Due to altercation at the [facility] home patient was taken by the police and kept him and that they revisit him this morning without informing any family members. By the time brother went there he was released and they were unable to found him. He was found by the EMS unresponsive. On arrival to the emergency room he had a generalized tonic-clonic seizures. Then he went into postictal status, not much responsive. He was given IV Keppra in the emergency room. It seems that he did not get his medication since yesterday he was arrested. According to the brother usually seizures are well controlled but he has short-term memory loss, does not remember anything. He will be keep asking for the same thing, noticed  the same in the notes at the other emergency room presentation recently. Patient is just becoming more alert but still confused and unsteady.     Past Medical History:   Diagnosis Date    HTN (hypertension)     Left inguinal hernia     Limbic encephalitis     Seizure ([facility]     Short-term memory loss      Past Surgical History:   Procedure Laterality Date    HX CRANIOTOMY      had brain cancer, does not know the details     Family History   Problem Relation Age of Onset    No Known Problems Mother         , not sure about medical issues      Social History     Tobacco Use    Smoking status: Former Smoker    Smokeless tobacco: Never Used   Substance Use Topics    Alcohol use: Not Currently       Prior to Admission medications    Medication Sig Start Date End Date Taking? Authorizing Provider   levETIRAcetam (KEPPRA) 500 mg tablet Take 2,000 mg by mouth every twelve (12) hours. 10/2/20   Kwan, MD Hue   lacosamide (VIMPAT) 100 mg tab tablet Take 200 mg by mouth two (2) times a day. 10/2/20   Hue Bowens MD   clonazePAM (KlonoPIN) 1 mg TbDi disintegrating tablet 1 wafer under the tongue PRN seizure. Max use 2 wafers per day 1/3/20   Hue Bowens MD   losartan (COZAAR) 100 mg tablet Take 100 mg by mouth daily. 20   Hue Bowens MD   topiramate (TOPAMAX) 100 mg tablet Take 200 mg by mouth every twelve (12) hours. 10/2/20   Hue Bowens MD   traZODone (DESYREL) 50 mg tablet Take 100 mg by mouth nightly. 10/2/20   Hue Bowens MD   metoprolol tartrate (LOPRESSOR) 25 mg tablet TAKE 1 TABLET BY MOUTH TWICE DAILY 19   Hue Bowens MD   PHENobarbitaL (LUMINAL) 32.4 mg tablet Take 64.8 mg by mouth two (2) times a day. 20   Hue Bowens MD     No Known Allergies          Review of Systems:  Due to altered mental status unable to find any information from the patient    Vitals:     Patient Vitals for the past 12 hrs:   Temp Pulse Resp BP SpO2   10/02/20 2021  94 17 (!) 126/94 99 %   10/02/20 1750  (!) 109 18 (!) 140/88 96 %   10/02/20 1726 99.7 °F (37.6 °C) (!) 138 16 (!) 164/99 94 %       Physical Exam:   General : Still little confused, not in any acute distress. Very unsteady. HEENT : PERRLA, normal oral mucosa, atraumatic normocephalic, Normal ear and nose.   Neck : Supple, no JVD, no masses noted, no carotid bruit.  Lungs : Breath sounds with good air entry bilaterally, no wheezes or rales, no accessory muscle use. CVS : Rhythm rate regular, S1+, S2+, no murmur or gallop. Abdomen : Soft, nontender, no organomegaly, bowel sounds active. Extremities : No edema noted,  pedal pulses palpable. Musculoskeletal : Fair range of motion, no joint swelling or effusion, muscle tone and power appears normal.   Skin : Moist, warm, skin turgor, no pathological rash. Lymphatic : No cervical lymphadenopathy. Neurological : Awake, alert, oriented to place and person. Still unable to communicate in conversation. At baseline he is able to conversation. Psychiatric : Confused. Data Review:   Recent Results (from the past 24 hour(s))   CBC WITH AUTOMATED DIFF    Collection Time: 10/02/20  5:15 PM   Result Value Ref Range    WBC 12.1 (H) 4.1 - 11.1 K/uL    RBC 4.56 4.10 - 5.70 M/uL    HGB 13.6 12.1 - 17.0 g/dL    HCT 42.1 36.6 - 50.3 %    MCV 92.3 80.0 - 99.0 FL    MCH 29.8 26.0 - 34.0 PG    MCHC 32.3 30.0 - 36.5 g/dL    RDW 12.4 11.5 - 14.5 %    PLATELET 252 376 - 203 K/uL    MPV 9.1 8.9 - 12.9 FL    NEUTROPHILS 77 (H) 32 - 75 %    LYMPHOCYTES 11 (L) 12 - 49 %    MONOCYTES 10 5 - 13 %    EOSINOPHILS 1 0 - 7 %    BASOPHILS 0 0 - 1 %    IMMATURE GRANULOCYTES 1 (H) 0.0 - 0.5 %    ABS. NEUTROPHILS 9.4 (H) 1.8 - 8.0 K/UL    ABS. LYMPHOCYTES 1.4 0.8 - 3.5 K/UL    ABS. MONOCYTES 1.3 (H) 0.0 - 1.0 K/UL    ABS. EOSINOPHILS 0.1 0.0 - 0.4 K/UL    ABS. BASOPHILS 0.0 0.0 - 0.1 K/UL    ABS. IMM.  GRANS. 0.1 (H) 0.00 - 0.04 K/UL    DF AUTOMATED     METABOLIC PANEL, COMPREHENSIVE    Collection Time: 10/02/20  5:15 PM   Result Value Ref Range    Sodium 134 (L) 136 - 145 mmol/L    Potassium 4.2 3.5 - 5.1 mmol/L    Chloride 104 97 - 108 mmol/L    CO2 23 21 - 32 mmol/L    Anion gap 7 5 - 15 mmol/L    Glucose 154 (H) 65 - 100 mg/dL    BUN 13 6 - 20 mg/dL    Creatinine 1.34 (H) 0.70 - 1.30 mg/dL    BUN/Creatinine ratio 10 (L) 12 - 20      GFR est AA >60 >60 ml/min/1.73m2    GFR est non-AA 57 (L) >60 ml/min/1.73m2    Calcium 8.3 (L) 8.5 - 10.1 mg/dL    Bilirubin, total 0.2 0.2 - 1.0 mg/dL    AST (SGOT) 28 15 - 37 U/L    ALT (SGPT) 26 12 - 78 U/L    Alk.  phosphatase 150 (H) 45 - 117 U/L    Protein, total 8.1 6.4 - 8.2 g/dL    Albumin 4.5 3.5 - 5.0 g/dL    Globulin 3.6 2.0 - 4.0 g/dL    A-G Ratio 1.3 1.1 - 2.2     TROPONIN I    Collection Time: 10/02/20  5:15 PM   Result Value Ref Range    Troponin-I, Qt. <0.05 <0.05 ng/mL   BNP    Collection Time: 10/02/20  5:15 PM   Result Value Ref Range    NT pro-BNP 16 <125 pg/mL   PTT    Collection Time: 10/02/20  5:15 PM   Result Value Ref Range    aPTT 23.5 23.0 - 35.7 sec    aPTT, therapeutic range   68 - 109 sec   URINALYSIS W/ REFLEX CULTURE    Collection Time: 10/02/20  5:45 PM    Specimen: Urine   Result Value Ref Range    Color Yellow/Straw      Appearance Clear Clear      Specific gravity 1.011 1.003 - 1.030      pH (UA) 5.0 5.0 - 8.0      Protein Negative Negative mg/dL    Glucose Negative Negative mg/dL    Ketone Negative Negative mg/dL    Bilirubin Negative Negative      Blood Negative Negative      Urobilinogen 0.1 0.1 - 1.0 EU/dL    Nitrites Negative Negative      Leukocyte Esterase Negative Negative      UA:UC IF INDICATED Culture not indicated by UA result Culture not indicated by UA result      WBC 0-4 0 - 4 /hpf    RBC 0-5 0 - 5 /hpf    Bacteria Negative Negative /hpf    Mucus Trace /lpf   DRUG SCREEN, URINE    Collection Time: 10/02/20  5:45 PM   Result Value Ref Range    AMPHETAMINES Negative Negative      BARBITURATES Positive (A) Negative      BENZODIAZEPINES Negative Negative      COCAINE Negative Negative      METHADONE Negative Negative      OPIATES Negative Negative      PCP(PHENCYCLIDINE) Negative Negative      THC (TH-CANNABINOL) Negative Negative      Drug screen comment        This test is a screen for drugs of abuse in a medical setting only (i.e., they are unconfirmed results and as such must not be used for non-medical purposes, e.g.,employment testing, legal testing). Due to its inherent nature, false positive (FP) and false negative (FN) results may be obtained. Therefore, if necessary for medical care, recommend confirmation of positive findings by GC/MS. Radiologic Studies :   CT Results  (Last 48 hours)               10/02/20 1719  CT HEAD WO CONT Final result    Impression:  IMPRESSION:    No acute intracranial abnormality. Narrative:  EXAMINATION: Computed tomography of the head without contrast.       HISTORY: Seizure and altered mental status       TECHNIQUE: Routine axial images were obtained through the brain without the use   of IV contrast. Sagittal and coronal reformatted images were performed at the CT   console. Dose Reduction: All CT scans at this facility are performed using dose reduction   optimization techniques as appropriate to a performed exam including the   following: Automated exposure control, adjustments of the mA and/or kV according   to patient size, or use of iterative reconstruction technique. COMPARISON: None. FINDINGS:    There is artifact, particularly at the skull base and in the posterior fossa. There is no acute intracranial hemorrhage. There is no CT evidence of acute   vascular territorial distribution infarction. The ventricles are normal in size   and morphology. There is no mass effect or midline shift. The basilar cisterns   are patent. The cavernous internal carotid arteries are atherosclerotic. The   visualized orbits, paranasal sinuses, and mastoid air cells are predominantly   clear. Surgical clips are seen in the left neck. There is no acute calvarial   fracture. 10/02/20 1719  CT SPINE CERV WO CONT Final result    Impression:  IMPRESSION:   1. No acute fracture the cervical spine. 2. Mild degenerative changes.        Narrative:  Examination: CT cervical spine without contrast.       Technique: Transaxial computed tomographic images of the cervical spine were   obtained without intravenous contrast. Coronal and sagittal reconstructions were   created. Dose Reduction: All CT scans at this facility are performed using dose reduction   optimization techniques as appropriate to a performed exam including the   following: Automated exposure control, adjustments of the mA and/or kV according   to patient size, or use of iterative reconstruction technique. COMPARISON: None available. FINDINGS: There is 1 mm retrolisthesis of C3 on C4. There is no facet   subluxation or dislocation. The craniocervical junction is normal. Vertebral   body heights are normal. There is no acute fracture of the cervical spine. There   is mild multilevel uncovertebral joint osteoarthritis, which results in mild   neural foraminal narrowing at multiple levels. There is mild multilevel   degenerative disc disease. Surgical clips are present in the left neck. There is   paraseptal emphysema in the visualized lung apices. There is no prevertebral   soft tissue swelling. CXR Results  (Last 48 hours)               10/02/20 1808  XR CHEST SNGL V Final result    Impression:  IMPRESSION: This examination is negative for acute pulmonary parenchymal   pathology. Narrative: The study is a portable AP radiograph of the chest dated 10/2/2020. HISTORY: Cough. COMPARISON: None. FINDINGS: The cardiac silhouette and mediastinal reflections are within normal   limits. The pulmonary vascular pattern is normal without vascular congestion. The lung parenchyma is clear. The costophrenic angles are maintained. There are   bridging osteophytes along the lower thoracic spine related to diffuse   idiopathic skeletal hyperostosis. Assessment and Plan :   Generalized tonic-clonic seizures with status epilepticus today: Seems secondary to not having medication since yesterday.   Reviewed home medications and will restart them. .    Short-term memory loss: Secondary to recent brain surgery and malignancy. There is a note in the other medical records that he has limbic encephalitis. Benign essential hypertension: We will continue Cozaar and metoprolol    Admitted to medical telemetry, full CODE STATUS, home medications reviewed with the list.  Patient unable to make any decisions, officially wife is the decision maker. Patient's cousin and brother helps. He needed placement at the time of discharge. Signed By: Diann De León MD     October 2, 2020          This dictation was done by dragon, computer voice recognition software. Often unanticipated grammatical, syntax, [employer] phones and other interpretive errors are inadvertently transcribed. Please excuse errors that have escaped final proofreading.
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
## 2020-10-03 NOTE — ED NOTES
Patient brother Tim Nevarez [phone]    Past Medical History:   Diagnosis Date    HTN (hypertension)     Left inguinal hernia     Limbic encephalitis     Seizure ([address]     Short-term memory loss      Past Surgical History:   Procedure Laterality Date    HX CRANIOTOMY      had brain cancer, does not know the details     Care assumed and bedside SBAR report endorsed on 51yo male who presents to ED for altered mental status from nursing home, , alert and oriented x3, pain currently within manageable limits, IV patent, plan of care reinforced, bed in lowest position, side rails up x2, call bell within reach, will continue to monitor. The patient is a 40y Male complaining of

## 2020-10-04 PROCEDURE — 74011250636 HC RX REV CODE- 250/636: Performed by: HOSPITALIST

## 2020-10-04 PROCEDURE — 65270000029 HC RM PRIVATE

## 2020-10-04 PROCEDURE — 74011000250 HC RX REV CODE- 250: Performed by: PSYCHIATRY & NEUROLOGY

## 2020-10-04 PROCEDURE — 74011250636 HC RX REV CODE- 250/636: Performed by: PSYCHIATRY & NEUROLOGY

## 2020-10-04 PROCEDURE — 74011250637 HC RX REV CODE- 250/637: Performed by: HOSPITALIST

## 2020-10-04 PROCEDURE — 74011250636 HC RX REV CODE- 250/636: Performed by: INTERNAL MEDICINE

## 2020-10-04 RX ORDER — HALOPERIDOL 5 MG/ML
2 INJECTION INTRAMUSCULAR ONCE
Status: DISCONTINUED | OUTPATIENT
Start: 2020-10-04 | End: 2020-10-04

## 2020-10-04 RX ORDER — LORAZEPAM 2 MG/ML
1 INJECTION INTRAMUSCULAR ONCE
Status: COMPLETED | OUTPATIENT
Start: 2020-10-04 | End: 2020-10-04

## 2020-10-04 RX ADMIN — ENOXAPARIN SODIUM 40 MG: 40 INJECTION SUBCUTANEOUS at 21:53

## 2020-10-04 RX ADMIN — LORAZEPAM 1 MG: 2 INJECTION, SOLUTION INTRAMUSCULAR; INTRAVENOUS at 15:54

## 2020-10-04 RX ADMIN — TRAZODONE HYDROCHLORIDE 100 MG: 50 TABLET ORAL at 01:18

## 2020-10-04 RX ADMIN — TOPIRAMATE 200 MG: 200 TABLET, FILM COATED ORAL at 21:53

## 2020-10-04 RX ADMIN — TOPIRAMATE 200 MG: 200 TABLET, FILM COATED ORAL at 11:11

## 2020-10-04 RX ADMIN — METOPROLOL TARTRATE 25 MG: 25 TABLET, FILM COATED ORAL at 11:11

## 2020-10-04 RX ADMIN — LEVETIRACETAM 2000 MG: 500 TABLET ORAL at 21:54

## 2020-10-04 RX ADMIN — Medication 10 ML: at 23:49

## 2020-10-04 RX ADMIN — PHENOBARBITAL 64.8 MG: 32.4 TABLET ORAL at 21:54

## 2020-10-04 RX ADMIN — LACOSAMIDE 200 MG: 200 TABLET, FILM COATED ORAL at 21:53

## 2020-10-04 RX ADMIN — WATER 20 MG: 1 INJECTION INTRAMUSCULAR; INTRAVENOUS; SUBCUTANEOUS at 09:30

## 2020-10-04 RX ADMIN — LORAZEPAM 1 MG: 2 INJECTION, SOLUTION INTRAMUSCULAR; INTRAVENOUS at 23:48

## 2020-10-04 RX ADMIN — LEVETIRACETAM 2000 MG: 500 TABLET ORAL at 11:12

## 2020-10-04 RX ADMIN — LORAZEPAM 1 MG: 2 INJECTION, SOLUTION INTRAMUSCULAR; INTRAVENOUS at 05:29

## 2020-10-04 RX ADMIN — PHENOBARBITAL 64.8 MG: 32.4 TABLET ORAL at 11:11

## 2020-10-04 RX ADMIN — TRAZODONE HYDROCHLORIDE 100 MG: 50 TABLET ORAL at 21:54

## 2020-10-04 RX ADMIN — METOPROLOL TARTRATE 25 MG: 25 TABLET, FILM COATED ORAL at 21:54

## 2020-10-04 RX ADMIN — LACOSAMIDE 200 MG: 200 TABLET, FILM COATED ORAL at 11:11

## 2020-10-04 NOTE — BSMART NOTE
Dionna Castrejon Writer responded to Code Carley on 4E hallway. Pt was in the ramos by the doors on the phone. Pt willingly walked back to him room, was on the phone w/ his cousin and brother. Writer spoke w/ Pt's brother who reports the Pt has a hx of short term memory loss and recommended giving the Pt some pen and paper because the Pt likes to write. Pt's brother asked for an update on the Pt's status and writer informed brother that the pt's nurse would reach out due to writer being unfamiliar w/ Pt. Pt got back into bed willingly and writer informed Pt he needed to stay in the room and use his call bell if he needed something, writer showed Pt where the call bell was and Pt verbalized understandnig. Writer provided Pt w/ pen and paper and removed shoes and clothing from the room in a bag and put a Pt label on them and left them at the nurses station. Writer also asked Rn to call his brother and provide an update. According to Pt's chart, Pt was recently seen in [facility] ED after being dropped off by the police. According to chart Pt has hx of memory loss / dementia, no psych hx. Writer recommended to Rn on 4E to put in a psych consult. Pt was recently admitted to [facility].

## 2020-10-04 NOTE — PROGRESS NOTES
Hospitalist Progress Note               Daily Progress Note: 10/4/2020      Subjective: The patient is seen for follow  up.   67-year-old male with a history of seizure disorder was admitted to the hospital with a complaint of altered mental status. Patient had a generalized tonic-clonic seizure in ER. He has been started on his antiseizure medications. Patient is pleasantly confused, unable to tell me why he is in the hospital.  Patient has periods of agitation     Medications reviewed  Current Facility-Administered Medications   Medication Dose Route Frequency    ziprasidone (GEODON) 20 mg in sterile water (preservative free) 1 mL injection  20 mg IntraMUSCular Q12H PRN    lacosamide (VIMPAT) tablet 200 mg  200 mg Oral BID    levETIRAcetam (KEPPRA) tablet 2,000 mg  2,000 mg Oral Q12H    metoprolol tartrate (LOPRESSOR) tablet 25 mg  25 mg Oral BID    PHENobarbitaL (LUMINAL) tablet 64.8 mg  64.8 mg Oral BID    topiramate (TOPAMAX) tablet 200 mg  200 mg Oral Q12H    traZODone (DESYREL) tablet 100 mg  100 mg Oral QHS    sodium chloride (NS) flush 5-40 mL  5-40 mL IntraVENous Q8H    sodium chloride (NS) flush 5-40 mL  5-40 mL IntraVENous PRN    LORazepam (ATIVAN) injection 1 mg  1 mg IntraVENous Q4H PRN    prochlorperazine (COMPAZINE) tablet 10 mg  10 mg Oral TID PRN    enoxaparin (LOVENOX) injection 40 mg  40 mg SubCUTAneous Q24H       Review of Systems:   Review of systems not obtained due to patient factors. Objective:   Physical Exam:     Visit Vitals  BP (!) 155/97 (BP 1 Location: Left arm)   Pulse 71   Temp 97.8 °F (36.6 °C)   Resp 18   Ht 5' 11\" (1.803 m)   Wt 77.9 kg (171 lb 11.8 oz)   SpO2 100%   BMI 23.95 kg/m²    O2 Flow Rate (L/min): 65 l/min O2 Device: Room air    Temp (24hrs), Av.4 °F (36.9 °C), Min:97.8 °F (36.6 °C), Max:99.1 °F (37.3 °C)    No intake/output data recorded.    10/02 1901 - 10/04 0700  In: -   Out: 500 [Urine:500]    PHYSICAL EXAM:  General: Awake and alert  Skin: Extremities and face reveal no rashes. HEENT: Sclerae anicteric. Extra-occular muscles are intact. No oral ulcers. No ENT discharge. The neck is supple. Cardiovascular: Regular rate and rhythm. No murmurs, gallops, or rubs. PMI nondisplaced. Carotids without bruits. Respiratory: Comfortable breathing with no accessory muscle use. Clear breath sounds with no wheezes, rales, or rhonchi. GI: Abdomen nondistended, soft, and nontender. Normal active bowel sounds. No enlargement of the liver or spleen. No masses palpable. Rectal: Deferred   Musculoskeletal: No pitting edema of the lower legs. Extremities have good range of motion. No costovertebral tenderness. Neurological: Pleasantly confused. Patient is alert. Moving all 4 extremities without any difficulties, cranial nerves II through XII grossly intact. Psychiatric: Calm and cooperative      Data Review:       Recent Days:  Recent Labs     10/02/20  1715   WBC 12.1*   HGB 13.6   HCT 42.1        Recent Labs     10/03/20  0158 10/02/20  1715    134*   K 3.2* 4.2    104   CO2 24 23   GLU 85 154*   BUN 13 13   CREA 1.06 1.34*   CA 8.2* 8.3*   ALB 3.5 4.5   TBILI 0.6 0.2   ALT 21 26     No results for input(s): PH, PCO2, PO2, HCO3, FIO2 in the last 72 hours. 24 Hour Results:  No results found for this or any previous visit (from the past 24 hour(s)). XR CHEST SNGL V   Final Result   IMPRESSION: This examination is negative for acute pulmonary parenchymal   pathology. CT HEAD WO CONT   Final Result   IMPRESSION:    No acute intracranial abnormality. CT SPINE CERV WO CONT   Final Result   IMPRESSION:   1. No acute fracture the cervical spine. 2. Mild degenerative changes.              Assessment/     Problem List:  Problem List as of 10/4/2020 Date Reviewed: 10/2/2020          Codes Class Noted - Resolved    HTN (hypertension) ICD-10-CM: I10  ICD-9-CM: 401.9  10/3/2020 - Present        * (Principal) Altered mental status ICD-10-CM: R41.82  ICD-9-CM: 780.97  10/2/2020 - Present        Epilepsia [facility]) ICD-10-CM: G40.909  ICD-9-CM: 345.90  10/14/2019 - Present    Overview Signed 10/2/2020  7:55 PM by Andres Murillo MD     7/29 consult neurosurgery for neuromodulation-DBS surgical placement             Anxiety ICD-10-CM: F41.9  ICD-9-CM: 300.00  10/4/2019 - Present        Memory disorder ICD-10-CM: R41.3  ICD-9-CM: 780.93  10/4/2019 - Present        Throat cancer ([facility] [address] ICD-10-CM: C14.0  ICD-9-CM: 149.0  7/22/2019 - Present    Overview Signed 10/2/2020  7:55 PM by Andres Murillo MD     7/2019 Followed by Dr Joel Becerra, [address]  ENT, has appt 8/15/19             RESOLVED: Insomnia ICD-10-CM: G47.00  ICD-9-CM: 780.52  7/1/2020 - 10/3/2020        RESOLVED: Limbic encephalitis ICD-10-CM: G04.90  ICD-9-CM: 323.9  10/4/2019 - 10/3/2020                   Plan:  52-year-old male with a history of for seizure disorder and short-term memory deficits was admitted to the hospital with a complaint of altered mental status and seizure activity  1. Tonic-clonic seizure with status epilepticus: Patient has been resumed on his home medications for seizure. Neurology evaluation of the patient appreciated  2. Short-term memory loss: There is a questionable history of brain surgery and malignancy, CT has been does not comment on any surgical changes. However there are some surgical clips in the left neck area. Unfortunately patient is not able to give meMuch history about his medical issues. Neurology eval appreciated  3. Hypertension: Resume patient's home medications and monitor blood pressure closely  4. Hypokalemia will supplement as needed.   COVID rule out- awaiting result  Further plan of management will depend upon patient's clinical course in the hospital  Discussed with case management regarding arrangement for discharge    Care Plan discussed with: Nurse and         Nereida Gusman MD

## 2020-10-05 LAB
ANION GAP SERPL CALC-SCNC: 8 MMOL/L (ref 5–15)
BUN SERPL-MCNC: 13 MG/DL (ref 6–20)
BUN/CREAT SERPL: 11 (ref 12–20)
CA-I BLD-MCNC: 8.6 MG/DL (ref 8.5–10.1)
CHLORIDE SERPL-SCNC: 110 MMOL/L (ref 97–108)
CO2 SERPL-SCNC: 24 MMOL/L (ref 21–32)
CREAT SERPL-MCNC: 1.17 MG/DL (ref 0.7–1.3)
GLUCOSE SERPL-MCNC: 108 MG/DL (ref 65–100)
POTASSIUM SERPL-SCNC: 3.8 MMOL/L (ref 3.5–5.1)
SARS-COV-2, COV2NT: NOT DETECTED
SODIUM SERPL-SCNC: 142 MMOL/L (ref 136–145)

## 2020-10-05 PROCEDURE — 74011250637 HC RX REV CODE- 250/637: Performed by: HOSPITALIST

## 2020-10-05 PROCEDURE — 74011250636 HC RX REV CODE- 250/636: Performed by: HOSPITALIST

## 2020-10-05 PROCEDURE — 74011250636 HC RX REV CODE- 250/636: Performed by: PSYCHIATRY & NEUROLOGY

## 2020-10-05 PROCEDURE — 36415 COLL VENOUS BLD VENIPUNCTURE: CPT

## 2020-10-05 PROCEDURE — 74011000250 HC RX REV CODE- 250: Performed by: PSYCHIATRY & NEUROLOGY

## 2020-10-05 PROCEDURE — 80048 BASIC METABOLIC PNL TOTAL CA: CPT

## 2020-10-05 PROCEDURE — 65270000029 HC RM PRIVATE

## 2020-10-05 RX ORDER — CLONIDINE HYDROCHLORIDE 0.1 MG/1
0.1 TABLET ORAL
Status: DISCONTINUED | OUTPATIENT
Start: 2020-10-05 | End: 2020-10-08 | Stop reason: HOSPADM

## 2020-10-05 RX ADMIN — METOPROLOL TARTRATE 25 MG: 25 TABLET, FILM COATED ORAL at 08:39

## 2020-10-05 RX ADMIN — WATER 20 MG: 1 INJECTION INTRAMUSCULAR; INTRAVENOUS; SUBCUTANEOUS at 13:03

## 2020-10-05 RX ADMIN — LACOSAMIDE 200 MG: 200 TABLET, FILM COATED ORAL at 08:39

## 2020-10-05 RX ADMIN — LEVETIRACETAM 2000 MG: 500 TABLET ORAL at 21:20

## 2020-10-05 RX ADMIN — PHENOBARBITAL 64.8 MG: 32.4 TABLET ORAL at 08:39

## 2020-10-05 RX ADMIN — CLONIDINE HYDROCHLORIDE 0.1 MG: 0.1 TABLET ORAL at 17:41

## 2020-10-05 RX ADMIN — LEVETIRACETAM 2000 MG: 500 TABLET ORAL at 08:39

## 2020-10-05 RX ADMIN — METOPROLOL TARTRATE 25 MG: 25 TABLET, FILM COATED ORAL at 21:20

## 2020-10-05 RX ADMIN — LACOSAMIDE 200 MG: 200 TABLET, FILM COATED ORAL at 21:20

## 2020-10-05 RX ADMIN — TOPIRAMATE 200 MG: 200 TABLET, FILM COATED ORAL at 21:21

## 2020-10-05 RX ADMIN — TOPIRAMATE 200 MG: 200 TABLET, FILM COATED ORAL at 08:39

## 2020-10-05 RX ADMIN — LORAZEPAM 1 MG: 2 INJECTION, SOLUTION INTRAMUSCULAR; INTRAVENOUS at 08:38

## 2020-10-05 RX ADMIN — Medication 10 ML: at 21:22

## 2020-10-05 RX ADMIN — ENOXAPARIN SODIUM 40 MG: 40 INJECTION SUBCUTANEOUS at 21:21

## 2020-10-05 RX ADMIN — PHENOBARBITAL 64.8 MG: 32.4 TABLET ORAL at 21:20

## 2020-10-05 RX ADMIN — TRAZODONE HYDROCHLORIDE 100 MG: 50 TABLET ORAL at 21:20

## 2020-10-05 NOTE — PROGRESS NOTES
Problem: Falls - Risk of  Goal: *Absence of Falls  Description: Document Joey Aguilar Fall Risk and appropriate interventions in the flowsheet.   Outcome: Progressing Towards Goal  Note: Fall Risk Interventions:  Mobility Interventions: Bed/chair exit alarm    Mentation Interventions: Bed/chair exit alarm    Medication Interventions: Patient to call before getting OOB    Elimination Interventions: Call light in reach

## 2020-10-05 NOTE — PROGRESS NOTES
Pt ambulated in hallway yelling out and cursing. Pt wanted his lunch tray. Explained to pt that I would get his lunch tray and take it to his room. Pt continues to curse. Instructed pt to go to his room but pt refused. Pt ambulated past room and nursing station and when off of unit. Pt was assisted back to unit. Pt continue to curse and yelling out. Security had been call and arrived to assist pt back to room. Pt ambulated back to room and his lunch was set up for him. Pt stated to calm down. Pt was monitor by this nurse, Gricelda, supervisor, Merline Carvajal and security.

## 2020-10-05 NOTE — PROGRESS NOTES
Hospitalist Progress Note               Daily Progress Note: 10/5/2020      Subjective: The patient is seen for follow  up.   61-year-old male with a history of seizure disorder was admitted to the hospital with a complaint of altered mental status. Patient had a generalized tonic-clonic seizure in ER. He has been started on his antiseizure medications. Patient is pleasantly confused, unable to tell me why he is in the hospital.  Patient has periods of agitation  And needed geodon today AM    Medications reviewed  Current Facility-Administered Medications   Medication Dose Route Frequency    ziprasidone (GEODON) 20 mg in sterile water (preservative free) 1 mL injection  20 mg IntraMUSCular Q12H PRN    lacosamide (VIMPAT) tablet 200 mg  200 mg Oral BID    levETIRAcetam (KEPPRA) tablet 2,000 mg  2,000 mg Oral Q12H    metoprolol tartrate (LOPRESSOR) tablet 25 mg  25 mg Oral BID    PHENobarbitaL (LUMINAL) tablet 64.8 mg  64.8 mg Oral BID    topiramate (TOPAMAX) tablet 200 mg  200 mg Oral Q12H    traZODone (DESYREL) tablet 100 mg  100 mg Oral QHS    sodium chloride (NS) flush 5-40 mL  5-40 mL IntraVENous Q8H    sodium chloride (NS) flush 5-40 mL  5-40 mL IntraVENous PRN    LORazepam (ATIVAN) injection 1 mg  1 mg IntraVENous Q4H PRN    prochlorperazine (COMPAZINE) tablet 10 mg  10 mg Oral TID PRN    enoxaparin (LOVENOX) injection 40 mg  40 mg SubCUTAneous Q24H       Review of Systems:   Review of systems not obtained due to patient factors. He denies any complaints. Objective:   Physical Exam:     Visit Vitals  BP (!) 153/96 (BP 1 Location: Right arm, BP Patient Position: At rest)   Pulse 87   Temp 97.1 °F (36.2 °C)   Resp 20   Ht 5' 11\" (1.803 m)   Wt 77.9 kg (171 lb 11.8 oz)   SpO2 100%   BMI 23.95 kg/m²    O2 Flow Rate (L/min): 65 l/min O2 Device: Room air    Temp (24hrs), Av.1 °F (36.2 °C), Min:97.1 °F (36.2 °C), Max:97.1 °F (36.2 °C)    No intake/output data recorded.    10/03 1901 - 10/05 0700  In: -   Out: 500 [Urine:500]    PHYSICAL EXAM:  General: Awake and alert  Skin: Extremities and face reveal no rashes. HEENT: Sclerae anicteric. Extra-occular muscles are intact. The neck is supple. Cardiovascular: Regular rate and rhythm. No murmurs, gallops, or rubs. Respiratory: Comfortable breathing with no accessory muscle use. Clear breath sounds with no wheezes, rales, or rhonchi. GI: Abdomen nondistended, soft, and nontender. Normal active bowel sounds. Rectal: Deferred   Musculoskeletal: No pitting edema of the lower legs. Extremities have good range of motion. No costovertebral tenderness. Neurological: Pleasantly confused. Patient is alert. Moving all 4 extremities without any difficulties, cranial nerves II through XII grossly intact. Psychiatric: Calm and cooperative      Data Review:       Recent Days:  Recent Labs     10/02/20  1715   WBC 12.1*   HGB 13.6   HCT 42.1        Recent Labs     10/03/20  0158 10/02/20  1715    134*   K 3.2* 4.2    104   CO2 24 23   GLU 85 154*   BUN 13 13   CREA 1.06 1.34*   CA 8.2* 8.3*   ALB 3.5 4.5   TBILI 0.6 0.2   ALT 21 26     No results for input(s): PH, PCO2, PO2, HCO3, FIO2 in the last 72 hours. 24 Hour Results:  No results found for this or any previous visit (from the past 24 hour(s)). XR CHEST SNGL V   Final Result   IMPRESSION: This examination is negative for acute pulmonary parenchymal   pathology. CT HEAD WO CONT   Final Result   IMPRESSION:    No acute intracranial abnormality. CT SPINE CERV WO CONT   Final Result   IMPRESSION:   1. No acute fracture the cervical spine. 2. Mild degenerative changes.              Assessment/     Problem List:  Problem List as of 10/5/2020 Date Reviewed: 10/2/2020          Codes Class Noted - Resolved    HTN (hypertension) ICD-10-CM: I10  ICD-9-CM: 401.9  10/3/2020 - Present        * (Principal) Altered mental status ICD-10-CM: R41.82  ICD-9-CM: 780.97  10/2/2020 - Present        Epilepsia [facility]) ICD-10-CM: G40.909  ICD-9-CM: 345.90  10/14/2019 - Present    Overview Signed 10/2/2020  7:55 PM by Lindsey Barnett MD     7/29 consult neurosurgery for neuromodulation-DBS surgical placement             Anxiety ICD-10-CM: F41.9  ICD-9-CM: 300.00  10/4/2019 - Present        Memory disorder ICD-10-CM: R41.3  ICD-9-CM: 780.93  10/4/2019 - Present        Throat cancer ([facility] [address] ICD-10-CM: C14.0  ICD-9-CM: 149.0  7/22/2019 - Present    Overview Signed 10/2/2020  7:55 PM by Lindsey Barnett MD     7/2019 Followed by Dr Shavon Shoemaker, [address] [place], has appt 8/15/19             RESOLVED: Insomnia ICD-10-CM: G47.00  ICD-9-CM: 780.52  7/1/2020 - 10/3/2020        RESOLVED: Limbic encephalitis ICD-10-CM: G04.90  ICD-9-CM: 323.9  10/4/2019 - 10/3/2020                   Plan:  67-year-old male with a history of for seizure disorder and short-term memory deficits was admitted to the hospital with a complaint of altered mental status and seizure activity  1. Tonic-clonic seizure with status epilepticus: Patient has been resumed on his home medications for seizure. Neurology evaluation of the patient appreciated  2. Short-term memory loss: There is a questionable history of brain surgery and malignancy, CT has been does not comment on any surgical changes. However there are some surgical clips in the left neck area. Unfortunately patient is not able to give meMuch history about his medical issues. Neurology eval appreciated  3. Hypertension: Resume patient's home medications and monitor blood pressure closely  4. Hypokalemia will supplement as needed.   COVID rule out- awaiting result  Further plan of management will depend upon patient's clinical course in the hospital  Discussed with case management regarding arrangement for discharge    Care Plan discussed with: Nurse and         Adrienne Abdalla MD
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
## 2020-10-06 ENCOUNTER — APPOINTMENT (OUTPATIENT)
Dept: NON INVASIVE DIAGNOSTICS | Age: 50
DRG: 053 | End: 2020-10-06
Attending: HOSPITALIST
Payer: MEDICAID

## 2020-10-06 LAB
ANION GAP SERPL CALC-SCNC: 6 MMOL/L (ref 5–15)
ATRIAL RATE: 119 BPM
BASOPHILS # BLD: 0.1 K/UL (ref 0–0.1)
BASOPHILS NFR BLD: 1 % (ref 0–1)
BUN SERPL-MCNC: 12 MG/DL (ref 6–20)
BUN/CREAT SERPL: 10 (ref 12–20)
CA-I BLD-MCNC: 8.9 MG/DL (ref 8.5–10.1)
CALCULATED P AXIS, ECG09: 65 DEGREES
CALCULATED R AXIS, ECG10: 60 DEGREES
CALCULATED T AXIS, ECG11: 67 DEGREES
CHLORIDE SERPL-SCNC: 108 MMOL/L (ref 97–108)
CO2 SERPL-SCNC: 28 MMOL/L (ref 21–32)
CREAT SERPL-MCNC: 1.19 MG/DL (ref 0.7–1.3)
DIAGNOSIS, 93000: NORMAL
DIFFERENTIAL METHOD BLD: ABNORMAL
ECHO AO ROOT DIAM: 3.4 CM
ECHO AV AREA PEAK VELOCITY: 2.75 CM2
ECHO AV AREA/BSA PEAK VELOCITY: 1.4 CM2/M2
ECHO AV PEAK GRADIENT: 7 MMHG
ECHO EST RA PRESSURE: 3 MMHG
ECHO LV E' SEPTAL VELOCITY: 8.87 CM/S
ECHO LV EJECTION FRACTION BIPLANE: 76.9 % (ref 55–100)
ECHO LV INTERNAL DIMENSION DIASTOLIC: 4.67 CM (ref 4.2–5.9)
ECHO LV INTERNAL DIMENSION SYSTOLIC: 2.87 CM
ECHO LV IVSD: 0.9 CM (ref 0.6–1)
ECHO LV MASS 2D: 154 G (ref 88–224)
ECHO LV MASS INDEX 2D: 77.9 G/M2 (ref 49–115)
ECHO LV POSTERIOR WALL DIASTOLIC: 1.02 CM (ref 0.6–1)
ECHO LVOT DIAM: 1.9 CM
ECHO LVOT PEAK GRADIENT: 6 MMHG
ECHO MV A VELOCITY: 54.3 CM/S
ECHO MV AREA PHT: 4.58 CM2
ECHO MV E DECELERATION TIME (DT): 0.18 S
ECHO MV E VELOCITY: 68.5 CM/S
ECHO MV E/A RATIO: 1.26
ECHO MV E/E' SEPTAL: 7.72
ECHO MV PRESSURE HALF TIME (PHT): 0.05 S
ECHO PV REGURGITANT MAX VELOCITY: 124 CM/S
ECHO PV REGURGITANT MAX VELOCITY: 128 CM/S
ECHO PVEIN A DURATION: 0.09 S
ECHO PVEIN A VELOCITY: 23.7 CM/S
ECHO RIGHT VENTRICULAR SYSTOLIC PRESSURE (RVSP): 23 MMHG
ECHO RV INTERNAL DIMENSION: 3.34 CM
ECHO TV MAX VELOCITY: 222 CM/S
ECHO TV REGURGITANT PEAK GRADIENT: 20 MMHG
EOSINOPHIL # BLD: 0.2 K/UL (ref 0–0.4)
EOSINOPHIL NFR BLD: 4 % (ref 0–7)
ERYTHROCYTE [DISTWIDTH] IN BLOOD BY AUTOMATED COUNT: 12.9 % (ref 11.5–14.5)
GLUCOSE SERPL-MCNC: 98 MG/DL (ref 65–100)
HCT VFR BLD AUTO: 41.2 % (ref 36.6–50.3)
HGB BLD-MCNC: 13.1 G/DL (ref 12.1–17)
IMM GRANULOCYTES # BLD AUTO: 0.1 K/UL (ref 0–0.04)
IMM GRANULOCYTES NFR BLD AUTO: 1 % (ref 0–0.5)
LYMPHOCYTES # BLD: 0.5 K/UL (ref 0.8–3.5)
LYMPHOCYTES NFR BLD: 9 % (ref 12–49)
MCH RBC QN AUTO: 29.9 PG (ref 26–34)
MCHC RBC AUTO-ENTMCNC: 31.8 G/DL (ref 30–36.5)
MCV RBC AUTO: 94.1 FL (ref 80–99)
MONOCYTES # BLD: 0.7 K/UL (ref 0–1)
MONOCYTES NFR BLD: 13 % (ref 5–13)
NEUTS SEG # BLD: 4.4 K/UL (ref 1.8–8)
NEUTS SEG NFR BLD: 72 % (ref 32–75)
P-R INTERVAL, ECG05: 166 MS
PLATELET # BLD AUTO: 319 K/UL (ref 150–400)
PMV BLD AUTO: 9.4 FL (ref 8.9–12.9)
POTASSIUM SERPL-SCNC: 3.6 MMOL/L (ref 3.5–5.1)
Q-T INTERVAL, ECG07: 304 MS
QRS DURATION, ECG06: 78 MS
QTC CALCULATION (BEZET), ECG08: 427 MS
RBC # BLD AUTO: 4.38 M/UL (ref 4.1–5.7)
SODIUM SERPL-SCNC: 142 MMOL/L (ref 136–145)
VENTRICULAR RATE, ECG03: 119 BPM
WBC # BLD AUTO: 5.9 K/UL (ref 4.1–11.1)

## 2020-10-06 PROCEDURE — 74011250636 HC RX REV CODE- 250/636: Performed by: PSYCHIATRY & NEUROLOGY

## 2020-10-06 PROCEDURE — 93306 TTE W/DOPPLER COMPLETE: CPT

## 2020-10-06 PROCEDURE — 74011250637 HC RX REV CODE- 250/637: Performed by: HOSPITALIST

## 2020-10-06 PROCEDURE — 80048 BASIC METABOLIC PNL TOTAL CA: CPT

## 2020-10-06 PROCEDURE — 74011000250 HC RX REV CODE- 250: Performed by: PSYCHIATRY & NEUROLOGY

## 2020-10-06 PROCEDURE — 85025 COMPLETE CBC W/AUTO DIFF WBC: CPT

## 2020-10-06 PROCEDURE — 65270000029 HC RM PRIVATE

## 2020-10-06 PROCEDURE — 74011250636 HC RX REV CODE- 250/636: Performed by: HOSPITALIST

## 2020-10-06 PROCEDURE — 36415 COLL VENOUS BLD VENIPUNCTURE: CPT

## 2020-10-06 RX ADMIN — Medication 10 ML: at 06:05

## 2020-10-06 RX ADMIN — LORAZEPAM 1 MG: 2 INJECTION, SOLUTION INTRAMUSCULAR; INTRAVENOUS at 07:44

## 2020-10-06 RX ADMIN — TOPIRAMATE 200 MG: 200 TABLET, FILM COATED ORAL at 22:00

## 2020-10-06 RX ADMIN — TRAZODONE HYDROCHLORIDE 100 MG: 50 TABLET ORAL at 21:59

## 2020-10-06 RX ADMIN — LEVETIRACETAM 2000 MG: 500 TABLET ORAL at 22:01

## 2020-10-06 RX ADMIN — TOPIRAMATE 200 MG: 200 TABLET, FILM COATED ORAL at 07:43

## 2020-10-06 RX ADMIN — Medication 10 ML: at 22:14

## 2020-10-06 RX ADMIN — LACOSAMIDE 200 MG: 200 TABLET, FILM COATED ORAL at 07:44

## 2020-10-06 RX ADMIN — WATER 20 MG: 1 INJECTION INTRAMUSCULAR; INTRAVENOUS; SUBCUTANEOUS at 10:07

## 2020-10-06 RX ADMIN — LORAZEPAM 1 MG: 2 INJECTION, SOLUTION INTRAMUSCULAR; INTRAVENOUS at 13:49

## 2020-10-06 RX ADMIN — PHENOBARBITAL 64.8 MG: 32.4 TABLET ORAL at 21:59

## 2020-10-06 RX ADMIN — METOPROLOL TARTRATE 25 MG: 25 TABLET, FILM COATED ORAL at 07:43

## 2020-10-06 RX ADMIN — Medication 10 ML: at 13:58

## 2020-10-06 RX ADMIN — METOPROLOL TARTRATE 25 MG: 25 TABLET, FILM COATED ORAL at 22:01

## 2020-10-06 RX ADMIN — PHENOBARBITAL 64.8 MG: 32.4 TABLET ORAL at 13:56

## 2020-10-06 RX ADMIN — ENOXAPARIN SODIUM 40 MG: 40 INJECTION SUBCUTANEOUS at 22:01

## 2020-10-06 RX ADMIN — LEVETIRACETAM 2000 MG: 500 TABLET ORAL at 07:42

## 2020-10-06 RX ADMIN — LACOSAMIDE 200 MG: 200 TABLET, FILM COATED ORAL at 22:00

## 2020-10-06 NOTE — PROGRESS NOTES
Hospitalist Progress Note               Daily Progress Note: 10/6/2020      Subjective: The patient is seen for follow  up.   27-year-old male with a history of seizure disorder was admitted to the hospital with a complaint of altered mental status. Patient had a generalized tonic-clonic seizure in ER. He has been started on his antiseizure medications. Patient seen and evaluated at bedside, currently still has episodes of agitation, however improved. Medications reviewed  Current Facility-Administered Medications   Medication Dose Route Frequency    cloNIDine HCL (CATAPRES) tablet 0.1 mg  0.1 mg Oral Q8H PRN    ziprasidone (GEODON) 20 mg in sterile water (preservative free) 1 mL injection  20 mg IntraMUSCular Q12H PRN    lacosamide (VIMPAT) tablet 200 mg  200 mg Oral BID    levETIRAcetam (KEPPRA) tablet 2,000 mg  2,000 mg Oral Q12H    metoprolol tartrate (LOPRESSOR) tablet 25 mg  25 mg Oral BID    PHENobarbitaL (LUMINAL) tablet 64.8 mg  64.8 mg Oral BID    topiramate (TOPAMAX) tablet 200 mg  200 mg Oral Q12H    traZODone (DESYREL) tablet 100 mg  100 mg Oral QHS    sodium chloride (NS) flush 5-40 mL  5-40 mL IntraVENous Q8H    sodium chloride (NS) flush 5-40 mL  5-40 mL IntraVENous PRN    LORazepam (ATIVAN) injection 1 mg  1 mg IntraVENous Q4H PRN    prochlorperazine (COMPAZINE) tablet 10 mg  10 mg Oral TID PRN    enoxaparin (LOVENOX) injection 40 mg  40 mg SubCUTAneous Q24H       Review of Systems:   Review of systems not obtained due to patient factors. He denies any complaints. Objective:   Physical Exam:     Visit Vitals  /88 (BP 1 Location: Right arm, BP Patient Position: At rest)   Pulse 70   Temp 98.5 °F (36.9 °C)   Resp 18   Ht 5' 11\" (1.803 m)   Wt 77.9 kg (171 lb 11.8 oz)   SpO2 100%   BMI 23.95 kg/m²    O2 Flow Rate (L/min): 65 l/min O2 Device: Room air    Temp (24hrs), Av.1 °F (36.7 °C), Min:97.8 °F (36.6 °C), Max:98.5 °F (36.9 °C)    No intake/output data recorded. No intake/output data recorded. PHYSICAL EXAM:  General: Awake and alert  Skin: Extremities and face reveal no rashes. HEENT: Sclerae anicteric. Extra-occular muscles are intact. The neck is supple. Cardiovascular: Regular rate and rhythm. No murmurs, gallops, or rubs. Respiratory: Comfortable breathing with no accessory muscle use. Clear breath sounds with no wheezes, rales, or rhonchi. GI: Abdomen nondistended, soft, and nontender. Normal active bowel sounds. Rectal: Deferred   Musculoskeletal: No pitting edema of the lower legs. Extremities have good range of motion. No costovertebral tenderness. Neurological: Pleasantly confused. Patient is alert. Moving all 4 extremities without any difficulties, cranial nerves II through XII grossly intact. Psychiatric: Calm and cooperative      Data Review:       Recent Days:  No results for input(s): WBC, HGB, HCT, PLT, HGBEXT, HCTEXT, PLTEXT, HGBEXT, HCTEXT, PLTEXT in the last 72 hours. Recent Labs     10/05/20  2145      K 3.8   *   CO2 24   *   BUN 13   CREA 1.17   CA 8.6     No results for input(s): PH, PCO2, PO2, HCO3, FIO2 in the last 72 hours. 24 Hour Results:  Recent Results (from the past 24 hour(s))   METABOLIC PANEL, BASIC    Collection Time: 10/05/20  9:45 PM   Result Value Ref Range    Sodium 142 136 - 145 mmol/L    Potassium 3.8 3.5 - 5.1 mmol/L    Chloride 110 (H) 97 - 108 mmol/L    CO2 24 21 - 32 mmol/L    Anion gap 8 5 - 15 mmol/L    Glucose 108 (H) 65 - 100 mg/dL    BUN 13 6 - 20 mg/dL    Creatinine 1.17 0.70 - 1.30 mg/dL    BUN/Creatinine ratio 11 (L) 12 - 20      GFR est AA >60 >60 ml/min/1.73m2    GFR est non-AA >60 >60 ml/min/1.73m2    Calcium 8.6 8.5 - 10.1 mg/dL       XR CHEST SNGL V   Final Result   IMPRESSION: This examination is negative for acute pulmonary parenchymal   pathology. CT HEAD WO CONT   Final Result   IMPRESSION:    No acute intracranial abnormality.       CT SPINE CERV WO CONT   Final Result IMPRESSION:   1. No acute fracture the cervical spine. 2. Mild degenerative changes. Assessment/     Problem List:  Problem List as of 10/6/2020 Date Reviewed: 10/2/2020          Codes Class Noted - Resolved    HTN (hypertension) ICD-10-CM: I10  ICD-9-CM: 401.9  10/3/2020 - Present        * (Principal) Altered mental status ICD-10-CM: R41.82  ICD-9-CM: 780.97  10/2/2020 - Present        Epilepsia ([facility] ICD-10-CM: G40.909  ICD-9-CM: 345.90  10/14/2019 - Present    Overview Signed 10/2/2020  7:55 PM by Heydi Olivera MD     7/29 consult neurosurgery for neuromodulation-DBS surgical placement             Anxiety ICD-10-CM: F41.9  ICD-9-CM: 300.00  10/4/2019 - Present        Memory disorder ICD-10-CM: R41.3  ICD-9-CM: 780.93  10/4/2019 - Present        Throat cancer ([facility] ICD-10-CM: C14.0  ICD-9-CM: 149.0  7/22/2019 - Present    Overview Signed 10/2/2020  7:55 PM by Heydi Olivera MD     7/2019 Followed by Dr Marie Mckeon, [address] ENT, has appt 8/15/19             RESOLVED: Insomnia ICD-10-CM: G47.00  ICD-9-CM: 780.52  7/1/2020 - 10/3/2020        RESOLVED: 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 Tonic-clonic seizure with status epilepticus: Patient has been resumed on his home medications for seizure. Neurology evaluation of the patient appreciated, no further episodes of seizure activity  2. Short-term memory loss: There is a questionable history of brain surgery and malignancy, CT has been does not comment on any surgical changes. However there are surgical clips in the left neck area. Limited history provided by patient. Neurology eval appreciated, will continue to follow  3.   Hypertension: continue patient's home medications and monitor blood pressure closely  4. Hypokalemia - obtain repeat potassium and assess for resolution.   COVID 19 has been ruled out    Ppx-Lovenox  FEN-regular diet, replete K to 4, Mag to 2  Full code,  Disposition - currently placement issue, stable for discharge, will discuss with case management      Care Plan discussed with: Nurse and         Ricardo Ocasio MD
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
## 2020-10-06 NOTE — PROGRESS NOTES
Problem: Falls - Risk of  Goal: *Absence of Falls  Description: Document Keila Danielsim Fall Risk and appropriate interventions in the flowsheet. Outcome: Progressing Towards Goal  Note: Fall Risk Interventions:  Mobility Interventions: Patient to call before getting OOB    Mentation Interventions: Bed/chair exit alarm    Medication Interventions: Evaluate medications/consider consulting pharmacy    Elimination Interventions: Call light in reach              Problem: Pressure Injury - Risk of  Goal: *Prevention of pressure injury  Description: Document Agusto Scale and appropriate interventions in the flowsheet.   Note: Pressure Injury Interventions:  Sensory Interventions: Keep linens dry and wrinkle-free    Moisture Interventions: Maintain skin hydration (lotion/cream)              Nutrition Interventions: Document food/fluid/supplement intake

## 2020-10-06 NOTE — PROGRESS NOTES
Pt ambulated to nursing station yelling at staff. Pt could not find personal clothes and shoes. Security was called  Pt was re-directed back to room. Pt found personal clothes in room. This nurse looked in the pt bed linen and found pair of  Underpants. Pt found shoes in room.   Will  Continue to monitor

## 2020-10-06 NOTE — PROGRESS NOTES
Pt alert and verbal.  Pt continues to have episodes of confusion. Pt ambulating in hallway. Re-directed pt back to room. Ativan 1 mg IV given per md orders.

## 2020-10-06 NOTE — PROGRESS NOTES
Problem: Falls - Risk of  Goal: *Absence of Falls  Description: Document Oracio Calzada Fall Risk and appropriate interventions in the flowsheet. Outcome: Progressing Towards Goal  Note: Fall Risk Interventions:  Mobility Interventions: Patient to call before getting OOB, PT Consult for mobility concerns, PT Consult for assist device competence    Mentation Interventions: Bed/chair exit alarm, More frequent rounding, Reorient patient    Medication Interventions: Evaluate medications/consider consulting pharmacy, Patient to call before getting OOB, Teach patient to arise slowly    Elimination Interventions: Call light in reach              Problem: Patient Education: Go to Patient Education Activity  Goal: Patient/Family Education  Outcome: Progressing Towards Goal     Problem: Pressure Injury - Risk of  Goal: *Prevention of pressure injury  Description: Document Agusto Scale and appropriate interventions in the flowsheet.   Outcome: Progressing Towards Goal  Note: Pressure Injury Interventions:  Sensory Interventions: Keep linens dry and wrinkle-free, Maintain/enhance activity level, Minimize linen layers, Monitor skin under medical devices    Moisture Interventions: Maintain skin hydration (lotion/cream), Minimize layers              Nutrition Interventions: Document food/fluid/supplement intake                     Problem: Patient Education: Go to Patient Education Activity  Goal: Patient/Family Education  Outcome: Progressing Towards Goal

## 2020-10-06 NOTE — PROGRESS NOTES
CM called pt's [facility], listed as spouse on the facesheet [phone]. Shanice Salinas indicated her last name is Gama Guaman and that she is not  to Mr. Alaniz, they are . Celso asked Shanice Salians if they had children and she questioned writer the reasoning for wanting to know that information. Cm explained to her family hierarchy for consent. Shanice Salinas states pt has a daughter, but she does not communicate with the patient's daughter. CM will contact pt's brother Mayuri Cazares [phone] to see if he has information for the daughter.

## 2020-10-07 LAB
ANION GAP SERPL CALC-SCNC: 5 MMOL/L (ref 5–15)
ANION GAP SERPL CALC-SCNC: 8 MMOL/L (ref 5–15)
BASOPHILS # BLD: 0.1 K/UL (ref 0–0.1)
BASOPHILS NFR BLD: 1 % (ref 0–1)
BUN SERPL-MCNC: 13 MG/DL (ref 6–20)
BUN SERPL-MCNC: 19 MG/DL (ref 6–20)
BUN/CREAT SERPL: 12 (ref 12–20)
BUN/CREAT SERPL: 19 (ref 12–20)
CA-I BLD-MCNC: 8.5 MG/DL (ref 8.5–10.1)
CA-I BLD-MCNC: 8.7 MG/DL (ref 8.5–10.1)
CHLORIDE SERPL-SCNC: 109 MMOL/L (ref 97–108)
CHLORIDE SERPL-SCNC: 110 MMOL/L (ref 97–108)
CO2 SERPL-SCNC: 24 MMOL/L (ref 21–32)
CO2 SERPL-SCNC: 27 MMOL/L (ref 21–32)
CREAT SERPL-MCNC: 1.02 MG/DL (ref 0.7–1.3)
CREAT SERPL-MCNC: 1.08 MG/DL (ref 0.7–1.3)
DIFFERENTIAL METHOD BLD: ABNORMAL
EOSINOPHIL # BLD: 0.2 K/UL (ref 0–0.4)
EOSINOPHIL NFR BLD: 3 % (ref 0–7)
ERYTHROCYTE [DISTWIDTH] IN BLOOD BY AUTOMATED COUNT: 12.7 % (ref 11.5–14.5)
GLUCOSE SERPL-MCNC: 90 MG/DL (ref 65–100)
GLUCOSE SERPL-MCNC: 94 MG/DL (ref 65–100)
HCT VFR BLD AUTO: 36.5 % (ref 36.6–50.3)
HGB BLD-MCNC: 11.7 G/DL (ref 12.1–17)
IMM GRANULOCYTES # BLD AUTO: 0.1 K/UL (ref 0–0.04)
IMM GRANULOCYTES NFR BLD AUTO: 1 % (ref 0–0.5)
LYMPHOCYTES # BLD: 0.6 K/UL (ref 0.8–3.5)
LYMPHOCYTES NFR BLD: 8 % (ref 12–49)
MCH RBC QN AUTO: 29.8 PG (ref 26–34)
MCHC RBC AUTO-ENTMCNC: 32.1 G/DL (ref 30–36.5)
MCV RBC AUTO: 92.9 FL (ref 80–99)
MONOCYTES # BLD: 1 K/UL (ref 0–1)
MONOCYTES NFR BLD: 11 % (ref 5–13)
NEUTS SEG # BLD: 6.6 K/UL (ref 1.8–8)
NEUTS SEG NFR BLD: 76 % (ref 32–75)
PLATELET # BLD AUTO: 301 K/UL (ref 150–400)
PMV BLD AUTO: 9.4 FL (ref 8.9–12.9)
POTASSIUM SERPL-SCNC: 3.8 MMOL/L (ref 3.5–5.1)
POTASSIUM SERPL-SCNC: 3.9 MMOL/L (ref 3.5–5.1)
RBC # BLD AUTO: 3.93 M/UL (ref 4.1–5.7)
SODIUM SERPL-SCNC: 141 MMOL/L (ref 136–145)
SODIUM SERPL-SCNC: 142 MMOL/L (ref 136–145)
WBC # BLD AUTO: 8.5 K/UL (ref 4.1–11.1)

## 2020-10-07 PROCEDURE — 74011250637 HC RX REV CODE- 250/637: Performed by: PSYCHIATRY & NEUROLOGY

## 2020-10-07 PROCEDURE — 74011250636 HC RX REV CODE- 250/636: Performed by: HOSPITALIST

## 2020-10-07 PROCEDURE — 65270000029 HC RM PRIVATE

## 2020-10-07 PROCEDURE — 80048 BASIC METABOLIC PNL TOTAL CA: CPT

## 2020-10-07 PROCEDURE — 85025 COMPLETE CBC W/AUTO DIFF WBC: CPT

## 2020-10-07 PROCEDURE — 74011250637 HC RX REV CODE- 250/637: Performed by: HOSPITALIST

## 2020-10-07 PROCEDURE — 74011250636 HC RX REV CODE- 250/636: Performed by: PSYCHIATRY & NEUROLOGY

## 2020-10-07 PROCEDURE — 74011000250 HC RX REV CODE- 250: Performed by: PSYCHIATRY & NEUROLOGY

## 2020-10-07 PROCEDURE — 94760 N-INVAS EAR/PLS OXIMETRY 1: CPT

## 2020-10-07 PROCEDURE — 36415 COLL VENOUS BLD VENIPUNCTURE: CPT

## 2020-10-07 RX ORDER — QUETIAPINE FUMARATE 25 MG/1
50 TABLET, FILM COATED ORAL 3 TIMES DAILY
Status: DISCONTINUED | OUTPATIENT
Start: 2020-10-07 | End: 2020-10-08 | Stop reason: HOSPADM

## 2020-10-07 RX ORDER — DIVALPROEX SODIUM 500 MG/1
500 TABLET, DELAYED RELEASE ORAL 2 TIMES DAILY
Status: DISCONTINUED | OUTPATIENT
Start: 2020-10-07 | End: 2020-10-08 | Stop reason: HOSPADM

## 2020-10-07 RX ADMIN — ENOXAPARIN SODIUM 40 MG: 40 INJECTION SUBCUTANEOUS at 22:03

## 2020-10-07 RX ADMIN — Medication 10 ML: at 13:23

## 2020-10-07 RX ADMIN — METOPROLOL TARTRATE 25 MG: 25 TABLET, FILM COATED ORAL at 22:03

## 2020-10-07 RX ADMIN — PHENOBARBITAL 64.8 MG: 32.4 TABLET ORAL at 22:02

## 2020-10-07 RX ADMIN — LACOSAMIDE 200 MG: 200 TABLET, FILM COATED ORAL at 09:34

## 2020-10-07 RX ADMIN — CLONIDINE HYDROCHLORIDE 0.1 MG: 0.1 TABLET ORAL at 13:21

## 2020-10-07 RX ADMIN — LORAZEPAM 1 MG: 2 INJECTION, SOLUTION INTRAMUSCULAR; INTRAVENOUS at 15:06

## 2020-10-07 RX ADMIN — Medication 10 ML: at 13:22

## 2020-10-07 RX ADMIN — LORAZEPAM 1 MG: 2 INJECTION, SOLUTION INTRAMUSCULAR; INTRAVENOUS at 09:39

## 2020-10-07 RX ADMIN — Medication 10 ML: at 05:28

## 2020-10-07 RX ADMIN — DIVALPROEX SODIUM 500 MG: 500 TABLET, DELAYED RELEASE ORAL at 22:02

## 2020-10-07 RX ADMIN — DIVALPROEX SODIUM 500 MG: 500 TABLET, DELAYED RELEASE ORAL at 13:21

## 2020-10-07 RX ADMIN — TOPIRAMATE 200 MG: 200 TABLET, FILM COATED ORAL at 22:02

## 2020-10-07 RX ADMIN — QUETIAPINE FUMARATE 50 MG: 25 TABLET ORAL at 22:03

## 2020-10-07 RX ADMIN — Medication 10 ML: at 22:13

## 2020-10-07 RX ADMIN — QUETIAPINE FUMARATE 50 MG: 25 TABLET ORAL at 15:05

## 2020-10-07 RX ADMIN — LACOSAMIDE 200 MG: 200 TABLET, FILM COATED ORAL at 22:02

## 2020-10-07 RX ADMIN — TOPIRAMATE 200 MG: 200 TABLET, FILM COATED ORAL at 09:33

## 2020-10-07 RX ADMIN — WATER 20 MG: 1 INJECTION INTRAMUSCULAR; INTRAVENOUS; SUBCUTANEOUS at 22:03

## 2020-10-07 RX ADMIN — TRAZODONE HYDROCHLORIDE 100 MG: 50 TABLET ORAL at 22:01

## 2020-10-07 RX ADMIN — LORAZEPAM 1 MG: 2 INJECTION, SOLUTION INTRAMUSCULAR; INTRAVENOUS at 04:53

## 2020-10-07 RX ADMIN — LEVETIRACETAM 2000 MG: 500 TABLET ORAL at 22:02

## 2020-10-07 RX ADMIN — PHENOBARBITAL 64.8 MG: 32.4 TABLET ORAL at 09:34

## 2020-10-07 RX ADMIN — WATER 20 MG: 1 INJECTION INTRAMUSCULAR; INTRAVENOUS; SUBCUTANEOUS at 00:26

## 2020-10-07 RX ADMIN — METOPROLOL TARTRATE 25 MG: 25 TABLET, FILM COATED ORAL at 09:34

## 2020-10-07 RX ADMIN — LEVETIRACETAM 2000 MG: 500 TABLET ORAL at 09:33

## 2020-10-07 RX ADMIN — LORAZEPAM 1 MG: 2 INJECTION, SOLUTION INTRAMUSCULAR; INTRAVENOUS at 22:03
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
## 2020-10-07 NOTE — PROGRESS NOTES
Hospitalist Progress Note               Daily Progress Note: 10/7/2020      Subjective: The patient is seen for follow  up.   80-year-old male with a history of seizure disorder was admitted to the hospital with a complaint of altered mental status. Patient had a generalized tonic-clonic seizure in ER. He has been started on his antiseizure medications. Patient seen and evaluated at bedside, currently still has episodes of agitation, however improved. 10/7 patient seen and evaluated at bedside, patient currently sitting at bedside, no acute events overnight, patient has fluctuating mental status, discussed with RN events at bedside    Medications reviewed  Current Facility-Administered Medications   Medication Dose Route Frequency    cloNIDine HCL (CATAPRES) tablet 0.1 mg  0.1 mg Oral Q8H PRN    ziprasidone (GEODON) 20 mg in sterile water (preservative free) 1 mL injection  20 mg IntraMUSCular Q12H PRN    lacosamide (VIMPAT) tablet 200 mg  200 mg Oral BID    levETIRAcetam (KEPPRA) tablet 2,000 mg  2,000 mg Oral Q12H    metoprolol tartrate (LOPRESSOR) tablet 25 mg  25 mg Oral BID    PHENobarbitaL (LUMINAL) tablet 64.8 mg  64.8 mg Oral BID    topiramate (TOPAMAX) tablet 200 mg  200 mg Oral Q12H    traZODone (DESYREL) tablet 100 mg  100 mg Oral QHS    sodium chloride (NS) flush 5-40 mL  5-40 mL IntraVENous Q8H    sodium chloride (NS) flush 5-40 mL  5-40 mL IntraVENous PRN    LORazepam (ATIVAN) injection 1 mg  1 mg IntraVENous Q4H PRN    prochlorperazine (COMPAZINE) tablet 10 mg  10 mg Oral TID PRN    enoxaparin (LOVENOX) injection 40 mg  40 mg SubCUTAneous Q24H       Review of Systems:   Review of systems not obtained due to patient factors. He denies any complaints.   Objective:   Physical Exam:     Visit Vitals  BP (!) 166/103   Pulse 77   Temp 97.7 °F (36.5 °C)   Resp 18   Ht 5' 10.98\" (1.803 m)   Wt 77.9 kg (171 lb 11.8 oz)   SpO2 100%   BMI 23.96 kg/m²    O2 Flow Rate (L/min): 65 l/min O2 Device: Room air    Temp (24hrs), Av °F (36.7 °C), Min:97.7 °F (36.5 °C), Max:98.3 °F (36.8 °C)    No intake/output data recorded. 10/05 1901 - 10/07 0700  In: 5 [P.O.:840]  Out: -     PHYSICAL EXAM:  General: Awake and alert  Skin: Extremities and face reveal no rashes. HEENT: Sclerae anicteric. Extra-occular muscles are intact. The neck is supple. Cardiovascular: Regular rate and rhythm. No murmurs, gallops, or rubs. Respiratory: Comfortable breathing with no accessory muscle use. Clear breath sounds with no wheezes, rales, or rhonchi. GI: Abdomen nondistended, soft, and nontender. Normal active bowel sounds. Rectal: Deferred   Musculoskeletal: No pitting edema of the lower legs. Extremities have good range of motion. No costovertebral tenderness. Neurological: Pleasantly confused. Patient is alert. Moving all 4 extremities without any difficulties, cranial nerves II through XII grossly intact. Psychiatric: Calm and cooperative      Data Review:       Recent Days:  Recent Labs     10/06/20  1734   WBC 5.9   HGB 13.1   HCT 41.2        Recent Labs     10/07/20  0158 10/06/20  1734 10/05/20  2145    142 142   K 3.8 3.6 3.8   * 108 110*   CO2 24 28 24   GLU 94 98 108*   BUN 19 12 13   CREA 1.02 1.19 1.17   CA 8.5 8.9 8.6     No results for input(s): PH, PCO2, PO2, HCO3, FIO2 in the last 72 hours.     24 Hour Results:  Recent Results (from the past 24 hour(s))   ECHO ADULT COMPLETE    Collection Time: 10/06/20  3:45 PM   Result Value Ref Range    Pulmonic Regurgitant End Max Velocity 128.00 cm/s    AoV PG 7.00 mmHg    Aortic Valve Area by Continuity of Peak Velocity 2.75 cm2    Ao Root D 3.40 cm    IVSd 0.90 0.6 - 1.0 cm    LVIDd 4.67 4.2 - 5.9 cm    LVIDs 2.87 cm    LVOT d 1.90 cm    Pulmonic Regurgitant End Max Velocity 124.00 cm/s    LVOT Peak Gradient 6.00 mmHg    LVPWd 1.02 (A) 0.6 - 1.0 cm    LV E' Septal Velocity 8.87 cm/s    BP EF 76.9 55 - 100 %    E/E' septal 7.72 Mitral Valve E Wave Deceleration Time 0.18 s    Mitral Valve Pressure Half-time 0.05 s    MV A Thanh 54.30 cm/s    MV E Thanh 68.50 cm/s    MVA (PHT) 4.58 cm2    MV E/A 1.26     P Vein A Dur 0.09 s    Pulmonary Vein \"A\" Wave Velocity 23.70 cm/s    Est. RA Pressure 3.00 mmHg    RVIDd 3.34 cm    RVSP 23.00 mmHg    Tricuspid Valve Max Velocity 222.00 cm/s    Triscuspid Valve Regurgitation Peak Gradient 20.00 mmHg    LV Mass .0 88 - 224 g    LV Mass AL Index 77.9 49 - 115 g/m2    JAYY/BSA Pk Thanh 1.4 cm2/m2   CBC WITH AUTOMATED DIFF    Collection Time: 10/06/20  5:34 PM   Result Value Ref Range    WBC 5.9 4.1 - 11.1 K/uL    RBC 4.38 4.10 - 5.70 M/uL    HGB 13.1 12.1 - 17.0 g/dL    HCT 41.2 36.6 - 50.3 %    MCV 94.1 80.0 - 99.0 FL    MCH 29.9 26.0 - 34.0 PG    MCHC 31.8 30.0 - 36.5 g/dL    RDW 12.9 11.5 - 14.5 %    PLATELET 799 025 - 170 K/uL    MPV 9.4 8.9 - 12.9 FL    NEUTROPHILS 72 32 - 75 %    LYMPHOCYTES 9 (L) 12 - 49 %    MONOCYTES 13 5 - 13 %    EOSINOPHILS 4 0 - 7 %    BASOPHILS 1 0 - 1 %    IMMATURE GRANULOCYTES 1 (H) 0.0 - 0.5 %    ABS. NEUTROPHILS 4.4 1.8 - 8.0 K/UL    ABS. LYMPHOCYTES 0.5 (L) 0.8 - 3.5 K/UL    ABS. MONOCYTES 0.7 0.0 - 1.0 K/UL    ABS. EOSINOPHILS 0.2 0.0 - 0.4 K/UL    ABS. BASOPHILS 0.1 0.0 - 0.1 K/UL    ABS. IMM.  GRANS. 0.1 (H) 0.00 - 0.04 K/UL    DF AUTOMATED     METABOLIC PANEL, BASIC    Collection Time: 10/06/20  5:34 PM   Result Value Ref Range    Sodium 142 136 - 145 mmol/L    Potassium 3.6 3.5 - 5.1 mmol/L    Chloride 108 97 - 108 mmol/L    CO2 28 21 - 32 mmol/L    Anion gap 6 5 - 15 mmol/L    Glucose 98 65 - 100 mg/dL    BUN 12 6 - 20 mg/dL    Creatinine 1.19 0.70 - 1.30 mg/dL    BUN/Creatinine ratio 10 (L) 12 - 20      GFR est AA >60 >60 ml/min/1.73m2    GFR est non-AA >60 >60 ml/min/1.73m2    Calcium 8.9 8.5 - 17.9 mg/dL   METABOLIC PANEL, BASIC    Collection Time: 10/07/20  1:58 AM   Result Value Ref Range    Sodium 142 136 - 145 mmol/L    Potassium 3.8 3.5 - 5.1 mmol/L    Chloride 110 (H) 97 - 108 mmol/L    CO2 24 21 - 32 mmol/L    Anion gap 8 5 - 15 mmol/L    Glucose 94 65 - 100 mg/dL    BUN 19 6 - 20 mg/dL    Creatinine 1.02 0.70 - 1.30 mg/dL    BUN/Creatinine ratio 19 12 - 20      GFR est AA >60 >60 ml/min/1.73m2    GFR est non-AA >60 >60 ml/min/1.73m2    Calcium 8.5 8.5 - 10.1 mg/dL       XR CHEST SNGL V   Final Result   IMPRESSION: This examination is negative for acute pulmonary parenchymal   pathology. CT HEAD WO CONT   Final Result   IMPRESSION:    No acute intracranial abnormality. CT SPINE CERV WO CONT   Final Result   IMPRESSION:   1. No acute fracture the cervical spine. 2. Mild degenerative changes. Assessment/     Problem List:  Problem List as of 10/7/2020 Date Reviewed: 10/2/2020          Codes Class Noted - Resolved    HTN (hypertension) ICD-10-CM: I10  ICD-9-CM: 401.9  10/3/2020 - Present        * (Principal) Altered mental status ICD-10-CM: R41.82  ICD-9-CM: 780.97  10/2/2020 - Present        Epilepsia ([facility] ICD-10-CM: G40.909  ICD-9-CM: 345.90  10/14/2019 - Present    Overview Signed 10/2/2020  7:55 PM by Aguila Holt MD     7/29 consult neurosurgery for neuromodulation-DBS surgical placement             Anxiety ICD-10-CM: F41.9  ICD-9-CM: 300.00  10/4/2019 - Present        Memory disorder ICD-10-CM: R41.3  ICD-9-CM: 780.93  10/4/2019 - Present        Throat cancer ([facility] ICD-10-CM: C14.0  ICD-9-CM: 149.0  7/22/2019 - Present    Overview Signed 10/2/2020  7:55 PM by Aguila Holt MD     7/2019 Followed by Dr Martina Glez, [address] [place], has appt 8/15/19             RESOLVED: Insomnia ICD-10-CM: G47.00  ICD-9-CM: 780.52  7/1/2020 - 10/3/2020        RESOLVED: Limbic encephalitis ICD-10-CM: G04.90  ICD-9-CM: 323.9  10/4/2019 - 10/3/2020                   Plan:  67-year-old male with a history of for seizure disorder and short-term memory deficits was admitted to the hospital with a complaint of altered mental status and seizure activity  1. Tonic-clonic seizure with status epilepticus: Patient has been resumed on his home medications for seizure. Neurology evaluation of the patient appreciated, no further episodes of seizure activity  2. Short-term memory loss: There is a questionable history of brain surgery and malignancy, CT has been does not comment on any surgical changes. However there are surgical clips in the left neck area. Limited history provided by patient. Neurology eval appreciated, will continue to follow  3. Hypertension: continue patient's home medications and monitor blood pressure closely  4. Hypokalemia - obtain repeat potassium and assess for resolution.   COVID 19 has been ruled out    Ppx-Lovenox  FEN-regular diet, replete K to 4, Mag to 2  Full code,  Case discussed with patient's Pablo Rodriges [phone], would prefer placement to facility near [facility], updated case management  Disposition - currently placement issue, stable for discharge,   Care Plan discussed with: Nurse and , family patient        Ashlie Torres MD

## 2020-10-07 NOTE — PROGRESS NOTES
Cm attempted to contact Shree Burrell [phone] to obtain the sister's contact information. Cm was not able to get in touch with her. Cm called pt's brother Gilbert Warner [phone]. Cm asked Mr. Ivan Wong about pt's sister. Mr. Ivan Wong indicated the sister has a different mother, but they share the same father. Mr. Ivan Wong states he is mainly the only family in the pt's life. Cm discussed needing consent to send referrals for placement. Mr. Ivan Wong gave Cm consent to send referrals for placement in the [address] [place] [address]. CM explained to Mr. Archer a barrier to placement could be pt's behavior. Mr. Ivan Wong indicated pt is not in a familiar environment which is why he is having behaviors. Cm discussed having a back up plan in place in case we do not have an accepting facility. Mr. Ivan Wong confirmed pt can stay with him if CM cannot find a facility. CM explained to Mr. Archer he will need to f/up with the local department of  so they can get a  assigned to assist with placement. Mr. Ivan Wong voiced understanding. Cm discussed setting up personal care services in the home if pt ends up going to his house. CM will initiate referrals and keep Mr. Archer updated. Mr. Ivan Wong states if pt gets agitated, he can be contacted and he will speak to this brother to calm him down. CM provided pts primary nurse with Mr. Ivan Wong phone number and informed her to call the brother if pt gets agitated.

## 2020-10-07 NOTE — BH NOTES
Patient seen for follow-up he is here some episodes of agitation and from psych point few added the low-dose of Seroquel 50 mg 3 times a day and also Depakote 500 mg twice a day obviously has got brain injury brain surgery cognitive impairment trouble processing trouble remembering patient is this evening he is alert verbal and readily polite could tell me the name of the facility he says he is trying to find the name he thought this was July or November was not controlled denied suicidal thought denied any hallucinations delusions we will closely follow and see how he responds to the Seroquel Depakote he also is already trazodone some as needed Geodon as needed Ativan will further follow thank you end of dictation

## 2020-10-07 NOTE — PROGRESS NOTES
Cm spoke with pt's brother Juli Sharma [phone]. Apparently, pt was at [facility] for a day, got picked up by police, went to correction then got released from correction. Mr. Melissa Wylie states he was on his way to  his brother from correction, but he couldn't find him because he was released. Mr. Melissa Wylie states his brother has memory issues. Cm asked Mr. Melissa Wylie if he had a contact # for pt's daughter. Cm expalined to him Cm spoke with [facility] yesterday and was informed they are . Cm explained to him family hierarchy for consent. Mr. Melissa Wylie indicated he too just found out about the divorce. Mr. Melissa Wylie states if he knew [facility] couldn't take care of him, he would have taken him in. Mr. Melissa Wylie indicated his brother was released from intermediate a year ago. Mr. Melissa Wylie would like  to find placement in the [place] area. Mr. Melissa Wylie states if CM cannot find placement for his brother, then he may be able to take him to his house, however he wants to know if it is okay if he takes him home. Mr. Melissa Wylie was questioning the pt's medical condition. He is agreeable with speaking to the doctor. Mr. Melissa Wylie indicated he will get in touch with the mother of the daughter and will get back with CM for a contact number. CM provided him with writer's direct number. CM tiger text Dr. Linda Lincoln the brother's contact number. Cm received a phone call from Marco Escobar, the mother of pt's daughter. She states her daughter knows of her father, but she doesn't know him. Mrs. Nafisa Tang indicated she is willing to provide any assistance. Mrs. Nafisa Tang states she can provide pt's social and birth certificate if needed. Cm explained to her family hierarchy for consent. Since daughter will not be involved in pt's plan of care, CM will communicate with pt's sibling to obtain consent for placement. Mrs. Nafisa Tang states pt also has a sister and that she can get in touch with her. Mrs. Kohler's contact# [phone]. Case discussed with CM Director.

## 2020-10-07 NOTE — PROGRESS NOTES
Problem: Falls - Risk of  Goal: *Absence of Falls  Description: Document Juan Carlos Ruano Fall Risk and appropriate interventions in the flowsheet. Outcome: Progressing Towards Goal  Note: Fall Risk Interventions:  Mobility Interventions: Patient to call before getting OOB    Mentation Interventions: Bed/chair exit alarm    Medication Interventions: Evaluate medications/consider consulting pharmacy    Elimination Interventions: Call light in reach              Problem: Patient Education: Go to Patient Education Activity  Goal: Patient/Family Education  Outcome: Progressing Towards Goal     Problem: Pressure Injury - Risk of  Goal: *Prevention of pressure injury  Description: Document Agusto Scale and appropriate interventions in the flowsheet.   Outcome: Progressing Towards Goal  Note: Pressure Injury Interventions:  Sensory Interventions: Minimize linen layers    Moisture Interventions: Maintain skin hydration (lotion/cream)         Mobility Interventions: Float heels    Nutrition Interventions: Document food/fluid/supplement intake                     Problem: Patient Education: Go to Patient Education Activity  Goal: Patient/Family Education  Outcome: Progressing Towards Goal

## 2020-10-08 VITALS
RESPIRATION RATE: 18 BRPM | OXYGEN SATURATION: 99 % | WEIGHT: 171.74 LBS | DIASTOLIC BLOOD PRESSURE: 88 MMHG | BODY MASS INDEX: 24.04 KG/M2 | SYSTOLIC BLOOD PRESSURE: 144 MMHG | TEMPERATURE: 98.1 F | HEART RATE: 78 BPM | HEIGHT: 71 IN

## 2020-10-08 PROCEDURE — 74011000250 HC RX REV CODE- 250: Performed by: PSYCHIATRY & NEUROLOGY

## 2020-10-08 PROCEDURE — 74011250637 HC RX REV CODE- 250/637: Performed by: PSYCHIATRY & NEUROLOGY

## 2020-10-08 PROCEDURE — 74011250636 HC RX REV CODE- 250/636: Performed by: PSYCHIATRY & NEUROLOGY

## 2020-10-08 PROCEDURE — 74011250636 HC RX REV CODE- 250/636: Performed by: HOSPITALIST

## 2020-10-08 PROCEDURE — 74011250637 HC RX REV CODE- 250/637: Performed by: HOSPITALIST

## 2020-10-08 RX ORDER — LEVETIRACETAM 1000 MG/1
2000 TABLET ORAL EVERY 12 HOURS
Qty: 120 TAB | Refills: 0 | Status: SHIPPED | OUTPATIENT
Start: 2020-10-08 | End: 2020-10-08

## 2020-10-08 RX ORDER — TRAZODONE HYDROCHLORIDE 50 MG/1
100 TABLET ORAL
Qty: 60 TAB | Refills: 0 | Status: SHIPPED | OUTPATIENT
Start: 2020-10-08

## 2020-10-08 RX ORDER — METOPROLOL TARTRATE 25 MG/1
25 TABLET, FILM COATED ORAL 2 TIMES DAILY
Qty: 60 TAB | Refills: 0 | Status: SHIPPED | OUTPATIENT
Start: 2020-10-08 | End: 2020-10-08

## 2020-10-08 RX ORDER — QUETIAPINE FUMARATE 50 MG/1
50 TABLET, FILM COATED ORAL 3 TIMES DAILY
Qty: 90 TAB | Refills: 0 | Status: SHIPPED | OUTPATIENT
Start: 2020-10-08 | End: 2020-10-08

## 2020-10-08 RX ORDER — TOPIRAMATE 200 MG/1
200 TABLET ORAL EVERY 12 HOURS
Qty: 60 TAB | Refills: 0 | Status: SHIPPED | OUTPATIENT
Start: 2020-10-08 | End: 2020-10-08

## 2020-10-08 RX ORDER — LACOSAMIDE 100 MG/1
200 TABLET ORAL 2 TIMES DAILY
Qty: 60 TAB | Refills: 0 | Status: SHIPPED | OUTPATIENT
Start: 2020-10-08

## 2020-10-08 RX ORDER — METOPROLOL TARTRATE 25 MG/1
25 TABLET, FILM COATED ORAL 2 TIMES DAILY
Qty: 60 TAB | Refills: 0 | Status: SHIPPED | OUTPATIENT
Start: 2020-10-08

## 2020-10-08 RX ORDER — DIVALPROEX SODIUM 500 MG/1
500 TABLET, DELAYED RELEASE ORAL 2 TIMES DAILY
Qty: 60 TAB | Refills: 0 | Status: SHIPPED | OUTPATIENT
Start: 2020-10-08

## 2020-10-08 RX ORDER — TOPIRAMATE 200 MG/1
200 TABLET ORAL EVERY 12 HOURS
Qty: 60 TAB | Refills: 0 | Status: SHIPPED | OUTPATIENT
Start: 2020-10-08

## 2020-10-08 RX ORDER — LEVETIRACETAM 1000 MG/1
2000 TABLET ORAL EVERY 12 HOURS
Qty: 120 TAB | Refills: 0 | Status: SHIPPED | OUTPATIENT
Start: 2020-10-08

## 2020-10-08 RX ORDER — PHENOBARBITAL 32.4 MG/1
64.8 TABLET ORAL 2 TIMES DAILY
Qty: 120 TAB | Refills: 0 | Status: SHIPPED | OUTPATIENT
Start: 2020-10-08

## 2020-10-08 RX ORDER — DIVALPROEX SODIUM 500 MG/1
500 TABLET, DELAYED RELEASE ORAL 2 TIMES DAILY
Qty: 60 TAB | Refills: 0 | Status: SHIPPED | OUTPATIENT
Start: 2020-10-08 | End: 2020-10-08

## 2020-10-08 RX ORDER — QUETIAPINE FUMARATE 50 MG/1
50 TABLET, FILM COATED ORAL 3 TIMES DAILY
Qty: 90 TAB | Refills: 0 | Status: SHIPPED | OUTPATIENT
Start: 2020-10-08

## 2020-10-08 RX ADMIN — QUETIAPINE FUMARATE 50 MG: 25 TABLET ORAL at 15:00

## 2020-10-08 RX ADMIN — LORAZEPAM 1 MG: 2 INJECTION, SOLUTION INTRAMUSCULAR; INTRAVENOUS at 09:05

## 2020-10-08 RX ADMIN — PHENOBARBITAL 64.8 MG: 32.4 TABLET ORAL at 09:05

## 2020-10-08 RX ADMIN — WATER 20 MG: 1 INJECTION INTRAMUSCULAR; INTRAVENOUS; SUBCUTANEOUS at 10:31

## 2020-10-08 RX ADMIN — LORAZEPAM 1 MG: 2 INJECTION, SOLUTION INTRAMUSCULAR; INTRAVENOUS at 04:49

## 2020-10-08 RX ADMIN — QUETIAPINE FUMARATE 50 MG: 25 TABLET ORAL at 09:05

## 2020-10-08 RX ADMIN — Medication 10 ML: at 13:05

## 2020-10-08 RX ADMIN — METOPROLOL TARTRATE 25 MG: 25 TABLET, FILM COATED ORAL at 09:05

## 2020-10-08 RX ADMIN — TOPIRAMATE 200 MG: 200 TABLET, FILM COATED ORAL at 09:05

## 2020-10-08 RX ADMIN — Medication 10 ML: at 06:27

## 2020-10-08 RX ADMIN — LACOSAMIDE 200 MG: 200 TABLET, FILM COATED ORAL at 09:05

## 2020-10-08 RX ADMIN — LORAZEPAM 1 MG: 2 INJECTION, SOLUTION INTRAMUSCULAR; INTRAVENOUS at 14:57

## 2020-10-08 RX ADMIN — LEVETIRACETAM 2000 MG: 500 TABLET ORAL at 09:05

## 2020-10-08 RX ADMIN — DIVALPROEX SODIUM 500 MG: 500 TABLET, DELAYED RELEASE ORAL at 09:05

## 2020-10-08 NOTE — PROGRESS NOTES
Multiple referrals were sent via [facility] for placement. CM did not have an accepting facility. Pt was declined due to behavioral/mental health concerns, no bed available, or does not meet admission criteria. The following faciliites declined: [facility], [address] [facility] [address], [address] [facility], [facility] and [address], [address] of [address] and [address], [facility] and [address] [facility], [address] [facility][note 2], [employer], [address] [facility],5Th Floor of [facility] 47, [facility] 137 of [facility] 77, [address] of [place], [address] [facility], [address] [facility], [address], [address] [facility] [address], [facility] [place], [employer] and [address] [place] called [place] [phone] and left a vm for Odalys Almanzar in admissions. Cm called [facility] of [address] [phone] and left a vm for the . CM called Osvaldo MORELOS 935 at [facility] [phone] and no one answered the phone. Cm called [facility] [phone] and didn't get a hold of anyone in admissions. Celso called [facility] [phone] and spoke with Brittany Saavedra in admissions. Brittany Saavedra indicated she would review the referral. Celso called [facility] [phone] and writer was informed the admissions liaiison was gone for the day. Celso called [facility] [address] [phone] and was not able to get in touch with John Duong in admission. Writer and CELSO [employer] called pt's brother Gary Hawkins [phone] and placed him on speaker phone. CELSO explained to him we did not have an accepting facility. Celso informed him the reasoning for facility denials.  Celso informed Mr. Santiago Lizarraga pt is ready for discharge. Mr. Santiago Lizarraga indicated he can  his brother from the hospital around 6-7pm. Cm discussed home health and personal care arrangement. Mr. Santiago Lizarraga is agreeable with home health and personal care arrangement. Pt will be living with Mr. Santiago Lizarraga at the following home address: [facility], [address] [place] [address]. Cm informed Mr. Santiago Lizarraga he can f/up with the local Dept. Of  for assistance. CM updated Dr. Reji Grimaldo and pt's primary nurse of dc plan. Referrals sent via [facility].

## 2020-10-08 NOTE — PROGRESS NOTES
Problem: Falls - Risk of  Goal: *Absence of Falls  Description: Document Roula Brice Fall Risk and appropriate interventions in the flowsheet. Outcome: Progressing Towards Goal  Note: Fall Risk Interventions:  Mobility Interventions: Bed/chair exit alarm    Mentation Interventions: Adequate sleep, hydration, pain control, Bed/chair exit alarm    Medication Interventions: Bed/chair exit alarm    Elimination Interventions: Call light in reach, Bed/chair exit alarm              Problem: Patient Education: Go to Patient Education Activity  Goal: Patient/Family Education  Outcome: Progressing Towards Goal     Problem: Pressure Injury - Risk of  Goal: *Prevention of pressure injury  Description: Document Agusto Scale and appropriate interventions in the flowsheet.   Outcome: Progressing Towards Goal  Note: Pressure Injury Interventions:  Sensory Interventions: Keep linens dry and wrinkle-free, Minimize linen layers    Moisture Interventions: Maintain skin hydration (lotion/cream)    Activity Interventions: Increase time out of bed    Mobility Interventions: Float heels    Nutrition Interventions: Document food/fluid/supplement intake    Friction and Shear Interventions: Minimize layers                Problem: Patient Education: Go to Patient Education Activity  Goal: Patient/Family Education  Outcome: Progressing Towards Goal

## 2020-10-08 NOTE — PROGRESS NOTES
Pt Aox1-2. VSS. Calm affect. Cooperative with this nurse throughout the day. Patient was discharged at 17:45 home with brother Bibi Mobley, ambulatory by himself, wheeled down by RN. Pharmacy location verified by this RN. Pt's brother was educated on discharge orders and given discharge papers by this RN outside in front of the main lobby. All questions answered. It was handwritten on discharge papers by this RN to follow up with PCP/psychiatrist in 2 weeks per MD order. Pt's brother also made aware of Home Health to be calling him in the morning for set up, confirmed with Oval Laity by this RN. Quiana Gilbert was called to make sure that escripts for patient went through as pharmacy was added to information at 17:30 right before printing AVS preview, as brother was waiting outside to pick patient up. Quiana Gilbert called back and stated the prescriptions were in place properly at 1800. This RN called Bibi Mobley, pt's brother back, at 18:05 to make aware that it was confirmed that prescriptions were sent electronically to [facility] pharmacy on [address] in [place] where pt's brother lives. Pt sent with belongings including watch, bracelet, pants, sweatpants, underwear, and socks.

## 2020-10-08 NOTE — PROGRESS NOTES
Physician Progress Note      Becca Tineo  CSN #:                  [id number]  :                       1970  ADMIT DATE:       10/2/2020 5:02 PM  [address] DATE:  RESPONDING  PROVIDER #:        Shira Chairez MD          QUERY TEXT:    Pt admitted with Seizures and AMS. Pt noted to have History of brain tumor,  malignancy. If possible, please document in progress notes and discharge summary the relationship, if any, between brain malignancy  and seizures. 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 y,  per H and P  Clinical Indicators : Generalized tonic clonic seizures with status epilepticus today per H and P  Treatment: Keppra IV    Thank you. Terry Noel RN CDI, CCS  Ext [phone]  Options provided:  -- Seizures due to brain  malignancy  -- seizures  unrelated to brain  malignancy  -- Other - I will add my own diagnosis  -- Disagree - Not applicable / Not valid  -- Disagree - Clinically unable to determine / Unknown  -- Refer to Clinical Documentation Reviewer    PROVIDER RESPONSE TEXT:    This patient has seizures , unrelated to malignancy.     Query created by: Nir Palm on 10/8/2020 12:08 PM      Electronically signed by:  Shira Chairez MD 10/8/2020 4:38 PM

## 2020-10-08 NOTE — PROGRESS NOTES
Hospitalist Progress Note               Daily Progress Note: 10/8/2020      Subjective: The patient is seen for follow  up.   52-year-old male with a history of seizure disorder was admitted to the hospital with a complaint of altered mental status. Patient had a generalized tonic-clonic seizure in ER. He has been started on his antiseizure medications. Patient seen and evaluated at bedside, currently still has episodes of agitation, however improved. 10/8 SOPHIA overnight, pt seen and evaluated at bedside, discussed with RN events overnight. Medications reviewed  Current Facility-Administered Medications   Medication Dose Route Frequency    QUEtiapine (SEROquel) tablet 50 mg  50 mg Oral TID    divalproex DR (DEPAKOTE) tablet 500 mg  500 mg Oral BID    cloNIDine HCL (CATAPRES) tablet 0.1 mg  0.1 mg Oral Q8H PRN    ziprasidone (GEODON) 20 mg in sterile water (preservative free) 1 mL injection  20 mg IntraMUSCular Q12H PRN    lacosamide (VIMPAT) tablet 200 mg  200 mg Oral BID    levETIRAcetam (KEPPRA) tablet 2,000 mg  2,000 mg Oral Q12H    metoprolol tartrate (LOPRESSOR) tablet 25 mg  25 mg Oral BID    PHENobarbitaL (LUMINAL) tablet 64.8 mg  64.8 mg Oral BID    topiramate (TOPAMAX) tablet 200 mg  200 mg Oral Q12H    traZODone (DESYREL) tablet 100 mg  100 mg Oral QHS    sodium chloride (NS) flush 5-40 mL  5-40 mL IntraVENous Q8H    sodium chloride (NS) flush 5-40 mL  5-40 mL IntraVENous PRN    LORazepam (ATIVAN) injection 1 mg  1 mg IntraVENous Q4H PRN    prochlorperazine (COMPAZINE) tablet 10 mg  10 mg Oral TID PRN    enoxaparin (LOVENOX) injection 40 mg  40 mg SubCUTAneous Q24H       Review of Systems:   Review of systems not obtained due to patient factors. He denies any complaints.   Objective:   Physical Exam:     Visit Vitals  BP (!) 144/85   Pulse 75   Temp 98.3 °F (36.8 °C)   Resp 18   Ht 5' 10.98\" (1.803 m)   Wt 77.9 kg (171 lb 11.8 oz)   SpO2 99%   BMI 23.96 kg/m²    O2 Flow Rate (L/min): 65 l/min O2 Device: Room air    Temp (24hrs), Av °F (36.7 °C), Min:97.7 °F (36.5 °C), Max:98.3 °F (36.8 °C)    No intake/output data recorded. No intake/output data recorded. PHYSICAL EXAM:  General: Awake and alert  Skin: Extremities and face reveal no rashes. HEENT: Sclerae anicteric. Extra-occular muscles are intact. The neck is supple. Cardiovascular: Regular rate and rhythm. No murmurs, gallops, or rubs. Respiratory: Comfortable breathing with no accessory muscle use. Clear breath sounds with no wheezes, rales, or rhonchi. GI: Abdomen nondistended, soft, and nontender. Normal active bowel sounds. Rectal: Deferred   Musculoskeletal: No pitting edema of the lower legs. Extremities have good range of motion. No costovertebral tenderness. Neurological: Pleasantly confused. Patient is alert. Moving all 4 extremities without any difficulties, cranial nerves II through XII grossly intact. Psychiatric: Calm and cooperative      Data Review:       Recent Days:  Recent Labs     10/07/20  0400 10/06/20  1734   WBC 8.5 5.9   HGB 11.7* 13.1   HCT 36.5* 41.2    319     Recent Labs     10/07/20  0400 10/07/20  0158 10/06/20  1734    142 142   K 3.9 3.8 3.6   * 110* 108   CO2 27 24 28   GLU 90 94 98   BUN 13 19 12   CREA 1.08 1.02 1.19   CA 8.7 8.5 8.9     No results for input(s): PH, PCO2, PO2, HCO3, FIO2 in the last 72 hours. 24 Hour Results:  No results found for this or any previous visit (from the past 24 hour(s)). XR CHEST SNGL V   Final Result   IMPRESSION: This examination is negative for acute pulmonary parenchymal   pathology. CT HEAD WO CONT   Final Result   IMPRESSION:    No acute intracranial abnormality. CT SPINE CERV WO CONT   Final Result   IMPRESSION:   1. No acute fracture the cervical spine. 2. Mild degenerative changes.              Assessment/     Problem List:  Problem List as of 10/8/2020 Date Reviewed: 10/2/2020          Codes Class Noted - Resolved    HTN (hypertension) ICD-10-CM: I10  ICD-9-CM: 401.9  10/3/2020 - Present        * (Principal) Altered mental status ICD-10-CM: R41.82  ICD-9-CM: 780.97  10/2/2020 - Present        Epilepsia ([facility] ICD-10-CM: G40.909  ICD-9-CM: 345.90  10/14/2019 - Present    Overview Signed 10/2/2020  7:55 PM by Alexandre Ruiz MD     7/29 consult neurosurgery for neuromodulation-DBS surgical placement             Anxiety ICD-10-CM: F41.9  ICD-9-CM: 300.00  10/4/2019 - Present        Memory disorder ICD-10-CM: R41.3  ICD-9-CM: 780.93  10/4/2019 - Present        Throat cancer ([facility] ICD-10-CM: C14.0  ICD-9-CM: 149.0  7/22/2019 - Present    Overview Signed 10/2/2020  7:55 PM by Alexandre Ruiz MD     7/2019 Followed by Dr Marline Mccurdy, [facility], has appt 8/15/19             RESOLVED: Insomnia ICD-10-CM: G47.00  ICD-9-CM: 780.52  7/1/2020 - 10/3/2020        RESOLVED: 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 Tonic-clonic seizure with status epilepticus: Patient has been resumed on his home medications for seizure. Neurology evaluation of the patient appreciated, no further episodes of seizure activity  2. Short-term memory loss: There is a questionable history of brain surgery and malignancy, CT has been does not comment on any surgical changes. However there are surgical clips in the left neck area. Limited history provided by patient. Neurology eval appreciated, will continue to follow  3. Hypertension: continue patient's home medications and monitor blood pressure closely  4.   Hypokalemia - resolved  COVID 19 has been ruled out    Ppx-Lovenox  FEN-regular diet, replete K to 4, Mag to 2  Full code,  Case discussed with patient's [facility] [phone], would prefer placement to facility near [place], updated case management  Disposition - currently placement issue, stable for discharge,   Care Plan discussed with: Nurse and , family patient        Lexis Coronado MD

## 2020-10-08 NOTE — DISCHARGE SUMMARY
Hospitalist Discharge Summary     Patient ID:  Mela Haas  [id number]  53 y.o.  1970  10/2/2020    PCP on record: UNKNOWN    Admit date: 10/2/2020  Discharge date and time: 10/8/2020    DISCHARGE DIAGNOSIS:    Seizure disorder    CONSULTATIONS:  IP CONSULT TO NEUROLOGY  IP CONSULT TO PSYCHIATRY    Excerpted HPI from H&P of Diann De León MD:  53 y.o. male with history of seizures secondary to some brain malignancy presents to the emergency room with altered mental status. As per the brother this patient was completely fine when he was incarcerated in the past, 2 1/2 years before he was released he developed some kind of brain tumor had surgery done, he does not know about the diagnosis. But since then his mental status is completely changed, unable to take care of him at home. He was released 9 months ago, wife unable to take care of him and does not want to take care of anything for him. She is legally  still. He was recently evaluated in the emergency room, after multiple evaluations was placed in [address] [place] [address]. Due to altercation at the nursing home patient was taken by the police and kept him and that they revisit him this morning without informing any family members. By the time brother went there he was released and they were unable to found him. He was found by the EMS unresponsive. On arrival to the emergency room he had a generalized tonic-clonic seizures. Then he went into postictal status, not much responsive. He was given IV Keppra in the emergency room. It seems that he did not get his medication since yesterday he was arrested. According to the brother usually seizures are well controlled but he has short-term memory loss, does not remember anything. He will be keep asking for the same thing, noticed  the same in the notes at the other emergency room presentation recently.   Patient is just becoming more alert but still confused and unsteady. ______________________________________________________________________  DISCHARGE SUMMARY/HOSPITAL COURSE:  for full details see H&P, daily progress notes, labs, consult notes. Patient was subsequently admitted to [facility] with a diagnosis of seizure/status epilepticus secondary to medication noncompliance, patient also had a significant history of agitation, patient seizure medications were reinitiated, subsequently which patient had no further seizure episodes, patient seizure medications were readjusted, patient was evaluated by psychiatry for episodes of agitation following which patient psychiatric medications were readjusted, patient had a decreased frequency of episodes of agitation, following which patient returned back to his baseline clinical and functional status, multiple attempts were made to place patient in facility, subsequent to which patient's brother was contacted, and a decision was made in consultation with case management as well as patient's brother to discharge patient to patient's brothers home with home health including skilled nursing as well as social work.          _______________________________________________________________________  Patient seen and examined by me on discharge day. Pertinent Findings:  Gen:    Not in distress  Chest: Clear lungs  CVS:   Regular rhythm. S1/s2 no m/r/g  No edema  Abd:  Soft, not distended, not tender  Neuro:  Alert,Oriented x 2-3  _______________________________________________________________________  DISCHARGE MEDICATIONS:   Current Discharge Medication List      START taking these medications    Details   divalproex DR (DEPAKOTE) 500 mg tablet Take 1 Tab by mouth two (2) times a day. Qty: 60 Tab, Refills: 0      QUEtiapine (SEROquel) 50 mg tablet Take 1 Tab by mouth three (3) times daily.   Qty: 90 Tab, Refills: 0         CONTINUE these medications which have CHANGED    Details   lacosamide (VIMPAT) 100 mg tab tablet Take 2 Tabs by mouth two (2) times a day. Max Daily Amount: 400 mg. Qty: 60 Tab, Refills: 0    Associated Diagnoses: Partial symptomatic epilepsy with complex partial seizures, not intractable, without status epilepticus (HCC)      levETIRAcetam 1,000 mg tablet Take 2 Tabs by mouth every twelve (12) hours. Qty: 120 Tab, Refills: 0      metoprolol tartrate (LOPRESSOR) 25 mg tablet Take 1 Tab by mouth two (2) times a day. Qty: 60 Tab, Refills: 0      PHENobarbitaL (LUMINAL) 32.4 mg tablet Take 2 Tabs by mouth two (2) times a day. Max Daily Amount: 129.6 mg.  Qty: 120 Tab, Refills: 0    Associated Diagnoses: Partial symptomatic epilepsy with complex partial seizures, not intractable, without status epilepticus (HCC)      topiramate (TOPAMAX) 200 mg tablet Take 1 Tab by mouth every twelve (12) hours. Qty: 60 Tab, Refills: 0      traZODone (DESYREL) 50 mg tablet Take 2 Tabs by mouth nightly. Qty: 60 Tab, Refills: 0         STOP taking these medications       clonazePAM (KlonoPIN) 1 mg TbDi disintegrating tablet Comments:   Reason for Stopping:         losartan (COZAAR) 100 mg tablet Comments:   Reason for Stopping:                 Patient Follow Up Instructions: Activity: Activity as tolerated  Diet: Cardiac Diet  Wound Care: As directed    Follow-up with PCP/SW/Home Health team in 2 weeks.   Follow-up tests/labs As per PCP/SW/Home Health Team  Follow-up Information     Follow up With Specialties Details Why Contact Info    UNKNOWN            ________________________________________________________________    Risk of deterioration: Moderate    Condition at Discharge:  Stable  __________________________________________________________________    Disposition  Home with family and home health services    ____________________________________________________________________    Code Status: Full Code  ___________________________________________________________________      Total time in minutes spent coordinating this discharge (includes going over instructions, follow-up, prescriptions, and preparing report for sign off to her PCP) :  40 minutes    Signed:   Brandon Brito MD

## 2020-10-09 NOTE — PROGRESS NOTES
Many home health agencies declined the referral due to not accepting pt's insurance or they do not provide service in the area where the pt will be residing. The only home health company that has accepted is [address] [place] [address], however they will not be able to see the pt until the end of next week. CM uploaded dc order last evening.

## 2020-10-15 NOTE — PROGRESS NOTES
Entry for 10/15/2020    Cm received a voice message from pt's niece - Mrs. Lashawn Sharma. Cm called her back [phone]. Mrs. Lashawn Sharma had some questions. Questions were addressed. Mrs. Lsahawn Sharma would like a copy of pt's [facility]. CM received fax# [phone] to fax [facility]. Information faxed. CM received a phone call from Aditi Quick with [facility]. Home health is going to try to  assist with placement. Aditi Quick indicated personal care was discussed with family and they do not want that arranged.

## 2022-03-18 PROBLEM — F41.9 ANXIETY: Status: ACTIVE | Noted: 2019-10-04

## 2022-03-19 PROBLEM — I10 HTN (HYPERTENSION): Status: ACTIVE | Noted: 2020-10-03

## 2022-03-19 PROBLEM — R41.3 MEMORY DISORDER: Status: ACTIVE | Noted: 2019-10-04

## 2022-03-19 PROBLEM — R41.82 ALTERED MENTAL STATUS: Status: ACTIVE | Noted: 2020-10-02

## 2022-03-19 PROBLEM — G40.909 EPILEPSIA (HCC): Status: ACTIVE | Noted: 2019-10-14

## 2022-03-19 PROBLEM — C14.0 THROAT CANCER (HCC): Status: ACTIVE | Noted: 2019-07-22

## 2023-05-17 RX ORDER — LEVETIRACETAM 1000 MG/1
2000 TABLET ORAL EVERY 12 HOURS
COMMUNITY
Start: 2020-10-08

## 2023-05-17 RX ORDER — PHENOBARBITAL 32.4 MG/1
64.8 TABLET ORAL 2 TIMES DAILY
COMMUNITY
Start: 2020-10-08

## 2023-05-17 RX ORDER — DIVALPROEX SODIUM 500 MG/1
500 TABLET, DELAYED RELEASE ORAL 2 TIMES DAILY
COMMUNITY
Start: 2020-10-08

## 2023-05-17 RX ORDER — TRAZODONE HYDROCHLORIDE 50 MG/1
100 TABLET ORAL
COMMUNITY
Start: 2020-10-08

## 2023-05-17 RX ORDER — QUETIAPINE FUMARATE 50 MG/1
50 TABLET, FILM COATED ORAL 3 TIMES DAILY
COMMUNITY
Start: 2020-10-08

## 2023-05-17 RX ORDER — LACOSAMIDE 100 MG/1
200 TABLET ORAL 2 TIMES DAILY
COMMUNITY
Start: 2020-10-08

## 2024-03-28 NOTE — ED NOTES
Problem: ABCDS Injury Assessment  Goal: Absence of physical injury  Outcome: Progressing     Problem: Discharge Planning  Goal: Discharge to home or other facility with appropriate resources  Outcome: Progressing     Problem: Safety - Adult  Goal: Free from fall injury  Outcome: Progressing      Pt sitting calmly in chair in doorway of room. No current complaints.